# Patient Record
Sex: FEMALE | Race: WHITE | Employment: STUDENT | URBAN - METROPOLITAN AREA
[De-identification: names, ages, dates, MRNs, and addresses within clinical notes are randomized per-mention and may not be internally consistent; named-entity substitution may affect disease eponyms.]

---

## 2017-08-21 ENCOUNTER — OFFICE VISIT (OUTPATIENT)
Dept: OBGYN CLINIC | Facility: CLINIC | Age: 32
End: 2017-08-21

## 2017-08-21 DIAGNOSIS — IMO0002 MEET AND GREET FOR EXPECTANT MOTHER: Primary | ICD-10-CM

## 2017-08-22 ENCOUNTER — LAB ENCOUNTER (OUTPATIENT)
Dept: LAB | Facility: HOSPITAL | Age: 32
End: 2017-08-22
Attending: EMERGENCY MEDICINE
Payer: COMMERCIAL

## 2017-08-22 ENCOUNTER — NURSE ONLY (OUTPATIENT)
Dept: OBGYN CLINIC | Facility: CLINIC | Age: 32
End: 2017-08-22

## 2017-08-22 VITALS — HEIGHT: 65 IN

## 2017-08-22 DIAGNOSIS — Z34.03 ENCOUNTER FOR SUPERVISION OF NORMAL FIRST PREGNANCY IN THIRD TRIMESTER: ICD-10-CM

## 2017-08-22 DIAGNOSIS — Z34.03 ENCOUNTER FOR SUPERVISION OF NORMAL FIRST PREGNANCY IN THIRD TRIMESTER: Primary | ICD-10-CM

## 2017-08-22 DIAGNOSIS — Z3A.34 34 WEEKS GESTATION OF PREGNANCY: ICD-10-CM

## 2017-08-22 LAB
ANTIBODY SCREEN: NEGATIVE
BASOPHILS # BLD: 0 K/UL (ref 0–0.2)
BASOPHILS NFR BLD: 0 %
BILIRUB UR QL: NEGATIVE
COLOR UR: YELLOW
EOSINOPHIL # BLD: 0 K/UL (ref 0–0.7)
EOSINOPHIL NFR BLD: 1 %
ERYTHROCYTE [DISTWIDTH] IN BLOOD BY AUTOMATED COUNT: 12.8 % (ref 11–15)
GLUCOSE UR-MCNC: NEGATIVE MG/DL
HCT VFR BLD AUTO: 36.5 % (ref 35–48)
HGB BLD-MCNC: 12.5 G/DL (ref 12–16)
HGB UR QL STRIP.AUTO: NEGATIVE
KETONES UR-MCNC: NEGATIVE MG/DL
LEUKOCYTE ESTERASE UR QL STRIP.AUTO: NEGATIVE
LYMPHOCYTES # BLD: 1.3 K/UL (ref 1–4)
LYMPHOCYTES NFR BLD: 16 %
MCH RBC QN AUTO: 31.2 PG (ref 27–32)
MCHC RBC AUTO-ENTMCNC: 34.3 G/DL (ref 32–37)
MCV RBC AUTO: 90.8 FL (ref 80–100)
MONOCYTES # BLD: 0.7 K/UL (ref 0–1)
MONOCYTES NFR BLD: 9 %
NEUTROPHILS # BLD AUTO: 5.8 K/UL (ref 1.8–7.7)
NEUTROPHILS NFR BLD: 74 %
NITRITE UR QL STRIP.AUTO: NEGATIVE
PH UR: 7 [PH] (ref 5–8)
PLATELET # BLD AUTO: 313 K/UL (ref 140–400)
PMV BLD AUTO: 7.3 FL (ref 7.4–10.3)
PROT UR-MCNC: NEGATIVE MG/DL
RBC # BLD AUTO: 4.03 M/UL (ref 3.7–5.4)
RH BLOOD TYPE: POSITIVE
RUBV IGG SER-ACNC: 23.5 IU/ML
SP GR UR STRIP: 1.01 (ref 1–1.03)
UROBILINOGEN UR STRIP-ACNC: <2
VIT C UR-MCNC: NEGATIVE MG/DL
WBC # BLD AUTO: 7.8 K/UL (ref 4–11)

## 2017-08-22 PROCEDURE — 81003 URINALYSIS AUTO W/O SCOPE: CPT

## 2017-08-22 PROCEDURE — 86803 HEPATITIS C AB TEST: CPT

## 2017-08-22 PROCEDURE — 86900 BLOOD TYPING SEROLOGIC ABO: CPT

## 2017-08-22 PROCEDURE — 82652 VIT D 1 25-DIHYDROXY: CPT

## 2017-08-22 PROCEDURE — 86762 RUBELLA ANTIBODY: CPT

## 2017-08-22 PROCEDURE — 86787 VARICELLA-ZOSTER ANTIBODY: CPT

## 2017-08-22 PROCEDURE — 87086 URINE CULTURE/COLONY COUNT: CPT

## 2017-08-22 PROCEDURE — 86901 BLOOD TYPING SEROLOGIC RH(D): CPT

## 2017-08-22 PROCEDURE — 85025 COMPLETE CBC W/AUTO DIFF WBC: CPT

## 2017-08-22 PROCEDURE — 86850 RBC ANTIBODY SCREEN: CPT

## 2017-08-22 PROCEDURE — 36415 COLL VENOUS BLD VENIPUNCTURE: CPT

## 2017-08-22 PROCEDURE — 82306 VITAMIN D 25 HYDROXY: CPT

## 2017-08-22 NOTE — H&P
Pt here today for [facility]   Education visit, Educational material reviewed. Pt verbalized understanding. Rx given for missing pn labs.  Declined to have 1 hour glucose test done at this time, stated that if the Midwives see that she is experiencing symptoms of

## 2017-08-22 NOTE — PROGRESS NOTES
Reports low risk history. Currently living in [facility]. Has records from [facility] with normal prenatal care documented. Appropriate for RN ed visit and CNM visit in the next month. FHTs 140 today.

## 2017-08-23 ENCOUNTER — TELEPHONE (OUTPATIENT)
Dept: OBGYN CLINIC | Facility: CLINIC | Age: 32
End: 2017-08-23

## 2017-08-23 LAB — HCV AB SERPL QL IA: NONREACTIVE

## 2017-08-23 NOTE — TELEPHONE ENCOUNTER
----- Message from ESVIN Villarreal sent at 8/22/2017 10:09 PM CDT -----  pls review normal NOB labs

## 2017-08-23 NOTE — TELEPHONE ENCOUNTER
Informed pt that pt's UA, Rubella, Cbc normal NOB labs. Informed pt that her labs for Vit. D, Varicella, Hep C and Urine culture are still in process.

## 2017-08-24 LAB — VARICELLA-ZOSTER VIRUS AB, IGM: 0.01 ISR

## 2017-08-28 ENCOUNTER — APPOINTMENT (OUTPATIENT)
Dept: LAB | Facility: HOSPITAL | Age: 32
End: 2017-08-28
Attending: ADVANCED PRACTICE MIDWIFE
Payer: COMMERCIAL

## 2017-08-28 ENCOUNTER — INITIAL PRENATAL (OUTPATIENT)
Dept: OBGYN CLINIC | Facility: CLINIC | Age: 32
End: 2017-08-28

## 2017-08-28 VITALS
BODY MASS INDEX: 25 KG/M2 | DIASTOLIC BLOOD PRESSURE: 66 MMHG | SYSTOLIC BLOOD PRESSURE: 104 MMHG | WEIGHT: 149.63 LBS | HEART RATE: 62 BPM

## 2017-08-28 DIAGNOSIS — Z34.03 ENCOUNTER FOR SUPERVISION OF NORMAL FIRST PREGNANCY IN THIRD TRIMESTER: Primary | ICD-10-CM

## 2017-08-28 DIAGNOSIS — Z34.03 ENCOUNTER FOR SUPERVISION OF NORMAL FIRST PREGNANCY IN THIRD TRIMESTER: ICD-10-CM

## 2017-08-28 LAB
MULTISTIX LOT#: NORMAL NUMERIC
PH, URINE: 8 (ref 4.5–8)
SPECIFIC GRAVITY: 1.01 (ref 1–1.03)
URINE-COLOR: YELLOW
UROBILINOGEN,SEMI-QN: 0.2 MG/DL (ref 0–1.9)
VZV IGG SER IA-ACNC: 26.4 (ref 165–?)

## 2017-08-28 PROCEDURE — 36415 COLL VENOUS BLD VENIPUNCTURE: CPT

## 2017-08-28 PROCEDURE — 83036 HEMOGLOBIN GLYCOSYLATED A1C: CPT

## 2017-08-28 NOTE — PROGRESS NOTES
Reviewed labs declines DM screen. Hgba1c added to blood work if elevated will do GDM screen. Active baby. Patient reports hx of chicken pox however varicella titer is negative.

## 2017-08-29 ENCOUNTER — TELEPHONE (OUTPATIENT)
Dept: OBGYN CLINIC | Facility: CLINIC | Age: 32
End: 2017-08-29

## 2017-08-29 DIAGNOSIS — Z34.03 ENCOUNTER FOR SUPERVISION OF NORMAL FIRST PREGNANCY IN THIRD TRIMESTER: Primary | ICD-10-CM

## 2017-08-29 LAB
1,25-DIHYDROXYVITAMIN D: 144 PG/ML
HBA1C MFR BLD: 5.2 % (ref 4–6)

## 2017-08-29 NOTE — TELEPHONE ENCOUNTER
----- Message from ESVIN Villarreal sent at 8/28/2017 10:03 PM CDT -----  Pt is non-immune to varicella. Please advise her to take precautions during pregnancy to avoid contact with anyone who may be infected.   She could be vaccinated PP if desires

## 2017-08-30 ENCOUNTER — TELEPHONE (OUTPATIENT)
Dept: OBGYN CLINIC | Facility: CLINIC | Age: 32
End: 2017-08-30

## 2017-08-30 LAB — 25(OH)D3 SERPL-MCNC: 30.5 NG/ML

## 2017-08-30 NOTE — TELEPHONE ENCOUNTER
Pt needs a note stating she can take a prenatal water exercise class. Class is through [employer]. Would like it faxed to them [phone].  Pl adv

## 2017-08-30 NOTE — TELEPHONE ENCOUNTER
Called pt and informed her per BR. Pt reports was informed at visit with MBW. Pt then questioned result for Vitamin D. Pt was placed on hold and vitamin d was reviewed by MBW in office.  Informed per MBW that vitamin d was elevated and to stop supplementing

## 2017-08-30 NOTE — TELEPHONE ENCOUNTER
Letter comprised & faxed per pt request. Copy mailed to pt.  Pt notified, verbalized an understanding  & agrees w/ plan

## 2017-09-05 ENCOUNTER — TELEPHONE (OUTPATIENT)
Dept: OBGYN CLINIC | Facility: CLINIC | Age: 32
End: 2017-09-05

## 2017-09-11 ENCOUNTER — ROUTINE PRENATAL (OUTPATIENT)
Dept: OBGYN CLINIC | Facility: CLINIC | Age: 32
End: 2017-09-11

## 2017-09-11 VITALS
BODY MASS INDEX: 25.2 KG/M2 | DIASTOLIC BLOOD PRESSURE: 71 MMHG | WEIGHT: 151.25 LBS | SYSTOLIC BLOOD PRESSURE: 110 MMHG | HEIGHT: 65 IN | HEART RATE: 66 BPM

## 2017-09-11 DIAGNOSIS — Z34.03 ENCOUNTER FOR SUPERVISION OF NORMAL FIRST PREGNANCY IN THIRD TRIMESTER: Primary | ICD-10-CM

## 2017-09-11 LAB
APPEARANCE: CLEAR
MULTISTIX LOT#: NORMAL NUMERIC
PH, URINE: 7 (ref 4.5–8)
SPECIFIC GRAVITY: 1.02 (ref 1–1.03)
URINE-COLOR: YELLOW
UROBILINOGEN,SEMI-QN: 0 MG/DL (ref 0–1.9)

## 2017-09-11 NOTE — PROGRESS NOTES
Feeling well. No complaints. +FM. Rev SOL/when to call. GBS done and GC/CT. Living with her parents in [address] [place] [address], feeling settled and prepared.

## 2017-09-13 LAB
C TRACH DNA SPEC QL NAA+PROBE: NEGATIVE
N GONORRHOEA DNA SPEC QL NAA+PROBE: NEGATIVE

## 2017-09-20 ENCOUNTER — ROUTINE PRENATAL (OUTPATIENT)
Dept: OBGYN CLINIC | Facility: CLINIC | Age: 32
End: 2017-09-20

## 2017-09-20 VITALS
DIASTOLIC BLOOD PRESSURE: 69 MMHG | WEIGHT: 152 LBS | SYSTOLIC BLOOD PRESSURE: 105 MMHG | BODY MASS INDEX: 25 KG/M2 | HEART RATE: 68 BPM

## 2017-09-20 DIAGNOSIS — Z34.03 ENCOUNTER FOR SUPERVISION OF NORMAL FIRST PREGNANCY IN THIRD TRIMESTER: Primary | ICD-10-CM

## 2017-09-20 LAB
APPEARANCE: CLEAR
MULTISTIX LOT#: NORMAL NUMERIC
PH, URINE: 5 (ref 4.5–8)
SPECIFIC GRAVITY: 1 (ref 1–1.03)
URINE-COLOR: YELLOW
UROBILINOGEN,SEMI-QN: 0 MG/DL (ref 0–1.9)

## 2017-09-20 NOTE — PROGRESS NOTES
Active fetus Increasing BH contractions. Denies any leaking of fluid or bleeding. Reviewed S&S labor  Warning signs reviewed  All questions answered. 36 week information reviewed with pt.   Birth plan reviewed:    Support: FOB  Pain management: desires unme

## 2017-09-29 ENCOUNTER — ROUTINE PRENATAL (OUTPATIENT)
Dept: OBGYN CLINIC | Facility: CLINIC | Age: 32
End: 2017-09-29

## 2017-09-29 VITALS
WEIGHT: 154.38 LBS | BODY MASS INDEX: 26 KG/M2 | SYSTOLIC BLOOD PRESSURE: 108 MMHG | DIASTOLIC BLOOD PRESSURE: 68 MMHG | HEART RATE: 58 BPM

## 2017-09-29 DIAGNOSIS — Z34.93 NORMAL PREGNANCY IN THIRD TRIMESTER: Primary | ICD-10-CM

## 2017-09-29 LAB
APPEARANCE: CLEAR
MULTISTIX LOT#: NORMAL NUMERIC
PH, URINE: 6 (ref 4.5–8)
SPECIFIC GRAVITY: 1.02 (ref 1–1.03)
URINE-COLOR: YELLOW
UROBILINOGEN,SEMI-QN: 0 MG/DL (ref 0–1.9)

## 2017-09-29 PROCEDURE — 90471 IMMUNIZATION ADMIN: CPT | Performed by: ADVANCED PRACTICE MIDWIFE

## 2017-09-29 PROCEDURE — 90686 IIV4 VACC NO PRSV 0.5 ML IM: CPT | Performed by: ADVANCED PRACTICE MIDWIFE

## 2017-09-29 NOTE — PROGRESS NOTES
Baby active. No SOL. Cervix closed. Labor questions answered. Post dates plan reviewed. NST with next week visit. Kick counts, SOL & warning signs reviewed.

## 2017-10-09 ENCOUNTER — ROUTINE PRENATAL (OUTPATIENT)
Dept: OBGYN CLINIC | Facility: CLINIC | Age: 32
End: 2017-10-09

## 2017-10-09 ENCOUNTER — APPOINTMENT (OUTPATIENT)
Dept: OBGYN CLINIC | Facility: HOSPITAL | Age: 32
End: 2017-10-09
Payer: COMMERCIAL

## 2017-10-09 ENCOUNTER — HOSPITAL ENCOUNTER (OUTPATIENT)
Facility: HOSPITAL | Age: 32
Discharge: HOME | End: 2017-10-09
Attending: ADVANCED PRACTICE MIDWIFE | Admitting: OBSTETRICS & GYNECOLOGY
Payer: COMMERCIAL

## 2017-10-09 VITALS
HEART RATE: 56 BPM | SYSTOLIC BLOOD PRESSURE: 101 MMHG | BODY MASS INDEX: 26 KG/M2 | WEIGHT: 154 LBS | DIASTOLIC BLOOD PRESSURE: 66 MMHG

## 2017-10-09 DIAGNOSIS — Z34.03 ENCOUNTER FOR SUPERVISION OF NORMAL FIRST PREGNANCY IN THIRD TRIMESTER: Primary | ICD-10-CM

## 2017-10-09 PROCEDURE — 81002 URINALYSIS NONAUTO W/O SCOPE: CPT | Performed by: ADVANCED PRACTICE MIDWIFE

## 2017-10-09 NOTE — NST
Nonstress Test   Patient: Corinne Mercury    Gestation: 40w2d    NST: Postdates       Variability: Moderate           Accelerations: Yes           Decelerations: None            Baseline: 125 BPM           Uterine Irritability: No           Contractions: Not

## 2017-10-09 NOTE — PROGRESS NOTES
Active baby. Reactive NST. Plan for NST/CNM/CATALINO on 10/13. IOL at the latest 10/20. Reviewed danger signs.

## 2017-10-12 ENCOUNTER — TELEPHONE (OUTPATIENT)
Dept: OBGYN CLINIC | Facility: CLINIC | Age: 32
End: 2017-10-12

## 2017-10-12 NOTE — TELEPHONE ENCOUNTER
Pt reports losing mucus plug on Tuesday. Having mild irreg BH ctx of & on since then, especially at night. Unsure if its early labor. +fm. Denies lof or vb. 1st baby. GBS neg. Desires waterbirth. Reviewed labor precautions w/ pt. Pt has appt tomorrow.  Pt v

## 2017-10-13 ENCOUNTER — HOSPITAL ENCOUNTER (OUTPATIENT)
Facility: HOSPITAL | Age: 32
Discharge: HOME | End: 2017-10-13
Attending: ADVANCED PRACTICE MIDWIFE | Admitting: OBSTETRICS & GYNECOLOGY
Payer: COMMERCIAL

## 2017-10-13 ENCOUNTER — HOSPITAL ENCOUNTER (OUTPATIENT)
Dept: OBGYN CLINIC | Facility: HOSPITAL | Age: 32
Discharge: HOME | End: 2017-10-13
Payer: COMMERCIAL

## 2017-10-13 ENCOUNTER — ROUTINE PRENATAL (OUTPATIENT)
Dept: OBGYN CLINIC | Facility: CLINIC | Age: 32
End: 2017-10-13

## 2017-10-13 VITALS
WEIGHT: 154.25 LBS | DIASTOLIC BLOOD PRESSURE: 73 MMHG | SYSTOLIC BLOOD PRESSURE: 116 MMHG | HEART RATE: 64 BPM | HEIGHT: 65 IN | BODY MASS INDEX: 25.7 KG/M2

## 2017-10-13 VITALS — SYSTOLIC BLOOD PRESSURE: 112 MMHG | HEART RATE: 83 BPM | DIASTOLIC BLOOD PRESSURE: 73 MMHG

## 2017-10-13 DIAGNOSIS — Z34.03 ENCOUNTER FOR SUPERVISION OF NORMAL FIRST PREGNANCY IN THIRD TRIMESTER: Primary | ICD-10-CM

## 2017-10-13 PROCEDURE — 76815 OB US LIMITED FETUS(S): CPT | Performed by: ADVANCED PRACTICE MIDWIFE

## 2017-10-13 PROCEDURE — 59425 ANTEPARTUM CARE ONLY: CPT | Performed by: ADVANCED PRACTICE MIDWIFE

## 2017-10-13 PROCEDURE — 59025 FETAL NON-STRESS TEST: CPT | Performed by: ADVANCED PRACTICE MIDWIFE

## 2017-10-13 NOTE — PROGRESS NOTES
Baby active. Has been having contractions for a few days, getting a little stronger now. Reactive NST with contractions noted. CATALINO 5.4. Cervix 0/976/-8, Cephalic. Advised of low/borderline fluid.  Is likely in early labor now and will likely return tonight

## 2017-10-13 NOTE — NST
Nonstress Test   Patient: Orly Lopez    Gestation: 40w6d    NST:       Variability: Moderate           Accelerations: Yes           Decelerations: None            Baseline: 125 BPM           Uterine Irritability: Yes           Contractions: Irregular

## 2017-10-14 ENCOUNTER — HOSPITAL ENCOUNTER (INPATIENT)
Facility: HOSPITAL | Age: 32
LOS: 2 days | Discharge: HOME | End: 2017-10-16
Attending: ADVANCED PRACTICE MIDWIFE | Admitting: OBSTETRICS & GYNECOLOGY
Payer: COMMERCIAL

## 2017-10-14 PROBLEM — Z37.9 NORMAL LABOR: Status: ACTIVE | Noted: 2017-10-14

## 2017-10-14 PROCEDURE — 0KQM0ZZ REPAIR PERINEUM MUSCLE, OPEN APPROACH: ICD-10-PCS | Performed by: ADVANCED PRACTICE MIDWIFE

## 2017-10-14 PROCEDURE — 59410 OBSTETRICAL CARE: CPT | Performed by: ADVANCED PRACTICE MIDWIFE

## 2017-10-14 RX ORDER — SIMETHICONE 80 MG
80 TABLET,CHEWABLE ORAL 3 TIMES DAILY PRN
Status: DISCONTINUED | OUTPATIENT
Start: 2017-10-14 | End: 2017-10-16

## 2017-10-14 RX ORDER — IBUPROFEN 400 MG/1
200 TABLET ORAL EVERY 4 HOURS PRN
Status: DISCONTINUED | OUTPATIENT
Start: 2017-10-14 | End: 2017-10-16

## 2017-10-14 RX ORDER — SODIUM CHLORIDE 0.9 % (FLUSH) 0.9 %
10 SYRINGE (ML) INJECTION AS NEEDED
Status: DISCONTINUED | OUTPATIENT
Start: 2017-10-14 | End: 2017-10-16

## 2017-10-14 RX ORDER — TERBUTALINE SULFATE 1 MG/ML
0.25 INJECTION, SOLUTION SUBCUTANEOUS AS NEEDED
Status: DISCONTINUED | OUTPATIENT
Start: 2017-10-14 | End: 2017-10-14 | Stop reason: HOSPADM

## 2017-10-14 RX ORDER — IBUPROFEN 600 MG/1
600 TABLET ORAL ONCE AS NEEDED
Status: DISCONTINUED | OUTPATIENT
Start: 2017-10-14 | End: 2017-10-14 | Stop reason: HOSPADM

## 2017-10-14 RX ORDER — LIDOCAINE HYDROCHLORIDE 10 MG/ML
30 INJECTION, SOLUTION EPIDURAL; INFILTRATION; INTRACAUDAL; PERINEURAL ONCE
Status: DISCONTINUED | OUTPATIENT
Start: 2017-10-14 | End: 2017-10-14 | Stop reason: HOSPADM

## 2017-10-14 RX ORDER — BISACODYL 10 MG
10 SUPPOSITORY, RECTAL RECTAL ONCE AS NEEDED
Status: DISCONTINUED | OUTPATIENT
Start: 2017-10-14 | End: 2017-10-16

## 2017-10-14 RX ORDER — AMMONIA INHALANTS 0.04 G/.3ML
0.3 INHALANT RESPIRATORY (INHALATION) AS NEEDED
Status: DISCONTINUED | OUTPATIENT
Start: 2017-10-14 | End: 2017-10-16

## 2017-10-14 RX ORDER — DOCUSATE SODIUM 100 MG/1
100 CAPSULE, LIQUID FILLED ORAL 2 TIMES DAILY
Status: DISCONTINUED | OUTPATIENT
Start: 2017-10-14 | End: 2017-10-16

## 2017-10-14 RX ORDER — SODIUM CHLORIDE 0.9 % (FLUSH) 0.9 %
10 SYRINGE (ML) INJECTION AS NEEDED
Status: DISCONTINUED | OUTPATIENT
Start: 2017-10-14 | End: 2017-10-14 | Stop reason: HOSPADM

## 2017-10-14 RX ORDER — AMMONIA INHALANTS 0.04 G/.3ML
0.3 INHALANT RESPIRATORY (INHALATION) AS NEEDED
Status: DISCONTINUED | OUTPATIENT
Start: 2017-10-14 | End: 2017-10-14 | Stop reason: HOSPADM

## 2017-10-14 RX ORDER — SODIUM CHLORIDE, SODIUM LACTATE, POTASSIUM CHLORIDE, CALCIUM CHLORIDE 600; 310; 30; 20 MG/100ML; MG/100ML; MG/100ML; MG/100ML
INJECTION, SOLUTION INTRAVENOUS
Status: DISPENSED
Start: 2017-10-14 | End: 2017-10-14

## 2017-10-14 RX ORDER — LIDOCAINE HYDROCHLORIDE 10 MG/ML
INJECTION, SOLUTION EPIDURAL; INFILTRATION; INTRACAUDAL; PERINEURAL
Status: DISPENSED
Start: 2017-10-14 | End: 2017-10-14

## 2017-10-14 RX ORDER — IBUPROFEN 400 MG/1
400 TABLET ORAL EVERY 4 HOURS PRN
Status: DISCONTINUED | OUTPATIENT
Start: 2017-10-14 | End: 2017-10-16

## 2017-10-14 RX ORDER — PRENATAL VIT,CAL 76/IRON/FOLIC 29 MG-1 MG
1 TABLET ORAL DAILY
Status: DISCONTINUED | OUTPATIENT
Start: 2017-10-14 | End: 2017-10-16

## 2017-10-14 RX ORDER — DEXTROSE, SODIUM CHLORIDE, SODIUM LACTATE, POTASSIUM CHLORIDE, AND CALCIUM CHLORIDE 5; .6; .31; .03; .02 G/100ML; G/100ML; G/100ML; G/100ML; G/100ML
125 INJECTION, SOLUTION INTRAVENOUS CONTINUOUS
Status: DISCONTINUED | OUTPATIENT
Start: 2017-10-14 | End: 2017-10-14 | Stop reason: HOSPADM

## 2017-10-14 RX ORDER — IBUPROFEN 600 MG/1
600 TABLET ORAL EVERY 4 HOURS PRN
Status: DISCONTINUED | OUTPATIENT
Start: 2017-10-14 | End: 2017-10-16

## 2017-10-14 RX ORDER — TRISODIUM CITRATE DIHYDRATE AND CITRIC ACID MONOHYDRATE 500; 334 MG/5ML; MG/5ML
30 SOLUTION ORAL AS NEEDED
Status: DISCONTINUED | OUTPATIENT
Start: 2017-10-14 | End: 2017-10-14 | Stop reason: HOSPADM

## 2017-10-14 NOTE — H&P
[address] Patient Status:  Inpatient    1985 MRN [id number]   Location [address] [place] [address] Attending [address] Day # 0 Joanna Wen MD     Date of age and cooperative uncomfortable  Respiratory: clear, unlabored  Cardiac: RRR  Breast: normal appearance, nipples everted  Abdomen: soft, non-tender, EFW 6 1/2#, uterine contractions q 1-3  Sterile vaginal exam: 6-7/100/0  External Genitalia:  No lesions Time    HCT 38.3 % 35.0 - 48.0 % 10/14/17 0108    HGB 13.0 g/dL 12.0 - 16.0 g/dL 10/14/17 0108    Platelets 592 K/ - 400 K/UL 10/14/17 0108    TREP        Genital Group B Culture No Beta Hemolytic Strep Group B Isolated.    09/11/17 1503    TSH

## 2017-10-14 NOTE — PLAN OF CARE
Patient Centered Care    • Patient preferences are identified and integrated in the patient's plan of care Progressing        POSTPARTUM    • [address] normal postpartum course Progressing    • Optimize infant feeding at the breast Progres

## 2017-10-14 NOTE — PROGRESS NOTES
Patient received into room 360. Bedside report received from   [facility] . Patient transferred to bed from cart. Bed in locked and low position. Side rails x2 up. Vital signs within normal limits, fundus firm U/U  , lochia  small, no clots noted.  IV site in

## 2017-10-14 NOTE — L&D DELIVERY NOTE
Gerda Burton, Girl  [[id number]]     Labor Events    Rupture date:  10/14/17   Rupture time:  0058   Rupture type:  SROM   Fluid color:  Clear          Labor Event Times    Labor onset date/time:   10/13/17 2300   Dilation complete date/time:   10/14/17 0144 CDT Reflex irritability:   Muscle tone:   Respiratory effort:    Total:            0    2    2    2    2    8             1    2    2    2    2    9                                                Apgars assigned by:  Mary Trinidad RN   Vernon disposition:  wit

## 2017-10-15 NOTE — PROGRESS NOTES
[facility]    OB/GYNE Progress Note      Waleska Clemons Patient Status:  Inpatient    1985 MRN    Location [facility] 3SE Attending [address] [place] [address], [address] [place] [address] Rd Day #  PCP Lora Fam MD           HPI:   Waleska Clemons is a reports nipples are intact without redness or irritation, breastfeeding successfully  GI: denies abdominal pain,denies heartburn, denies constipation or diarrhea, no BM  : denies dysuria, pelvic pain, heavy lochia or clots  PERINEUM: sore but no acute pa

## 2017-10-15 NOTE — PLAN OF CARE
ANXIETY    • Will report anxiety at manageable levels Progressing        BREAST FEEDING    • Optimize infant feeding at the breast Progressing        INADEQUATE LATCH, SUCK OR SWALLOW    • Demonstrate ability to latch and sustain latch, audible swallowing Problem: Goal Outcome Summary  Goal: Goal Outcome Summary  Outcome: Adequate for Discharge Date Met:  03/17/17  Pain managed with Oxycodone and Tylenol, voiding in BR, up with assist 1 and walker, discharged home with .

## 2017-10-16 VITALS
RESPIRATION RATE: 17 BRPM | HEART RATE: 69 BPM | TEMPERATURE: 98 F | SYSTOLIC BLOOD PRESSURE: 107 MMHG | DIASTOLIC BLOOD PRESSURE: 69 MMHG

## 2017-10-16 NOTE — PROGRESS NOTES
Home in stable condition. No pt c/o. V/s and vaginal bleeding WNR. Teaching and dc instructions reinforced, pt watched dc videos, has education booklet, Q&A done.  F/u appts given  Home w/baby id bands verified via wheelchair accompanied by

## 2017-10-16 NOTE — LACTATION NOTE
LACTATION NOTE - MOTHER      Evaluation Type: Inpatient    Problems identified  Problems identified: Nipple pain;Knowledge deficit    Maternal history  Other/comment: IBS    Breastfeeding goal  Breastfeeding goal: To maintain breast milk feeding per patien

## 2017-10-16 NOTE — DISCHARGE SUMMARY
[facility]    Discharge Summary    Devaughn Ventura Patient Status:  Inpatient    1985 MRN [id number]   Location [facility] 3SE Attending ESVIN Hernandez   Hosp Day # 2       Delivering OB Clinician: Chema Anderson

## 2017-10-26 ENCOUNTER — TELEPHONE (OUTPATIENT)
Dept: LACTATION | Facility: HOSPITAL | Age: 32
End: 2017-10-26

## 2017-10-31 ENCOUNTER — POSTPARTUM (OUTPATIENT)
Dept: OBGYN CLINIC | Facility: CLINIC | Age: 32
End: 2017-10-31

## 2017-10-31 VITALS
HEART RATE: 46 BPM | BODY MASS INDEX: 23 KG/M2 | DIASTOLIC BLOOD PRESSURE: 76 MMHG | WEIGHT: 140 LBS | SYSTOLIC BLOOD PRESSURE: 112 MMHG

## 2017-10-31 DIAGNOSIS — Z30.09 FAMILY PLANNING: Primary | ICD-10-CM

## 2017-10-31 PROBLEM — Z34.03: Status: RESOLVED | Noted: 2017-08-28 | Resolved: 2017-10-31

## 2017-11-01 NOTE — PROGRESS NOTES
HPI:   Polina Odom is a 29year old  who presents for a 2 week Postpartum visit. Pt accompanied by baby  Reports adapting well and coping well. Breastfeeding successfully. No strain in relationship, partner supportive.     Labor and Delivery Histor established, denies nipple pain, cracking or bleeding, denies mastitis symptoms.   Baby nursing well and gaining weight appropriately  GI: denies abdominal pain,denies heartburn, denies constipation or diarrhea  : voiding without difficulty, denies dysuri

## 2017-11-03 ENCOUNTER — TELEPHONE (OUTPATIENT)
Dept: OBGYN CLINIC | Facility: CLINIC | Age: 32
End: 2017-11-03

## 2017-11-03 NOTE — TELEPHONE ENCOUNTER
Noted that pt was scheduled for nexplanon w/ MJ on 12/1 at her 6wk pp visit & MJ does not do nexplanon insert. Called pt to reschedule w/ BR or MBW. Appt rescheduled w/ BR on 11/30.  Pt verbalized an understanding & agrees w/ plan

## 2017-11-30 ENCOUNTER — OFFICE VISIT (OUTPATIENT)
Dept: OBGYN CLINIC | Facility: CLINIC | Age: 32
End: 2017-11-30

## 2017-11-30 VITALS
HEART RATE: 80 BPM | BODY MASS INDEX: 22.82 KG/M2 | WEIGHT: 137 LBS | SYSTOLIC BLOOD PRESSURE: 102 MMHG | DIASTOLIC BLOOD PRESSURE: 70 MMHG | HEIGHT: 65 IN

## 2017-11-30 DIAGNOSIS — R39.89 PAIN IN URETHRA: Primary | ICD-10-CM

## 2017-11-30 DIAGNOSIS — Z30.017 INSERTION OF IMPLANTABLE SUBDERMAL CONTRACEPTIVE: ICD-10-CM

## 2017-11-30 PROBLEM — Z37.9 NORMAL LABOR: Status: RESOLVED | Noted: 2017-10-14 | Resolved: 2017-11-30

## 2017-11-30 PROCEDURE — 81025 URINE PREGNANCY TEST: CPT | Performed by: ADVANCED PRACTICE MIDWIFE

## 2017-11-30 PROCEDURE — 11981 INSERTION DRUG DLVR IMPLANT: CPT | Performed by: ADVANCED PRACTICE MIDWIFE

## 2017-11-30 NOTE — PROGRESS NOTES
HPI:   Cedrick Franco is a 29year old  who presents for a 6 week Postpartum visit  Reports adapting well and coping well. Breastfeeding successfully. No strain in relationship, partner supportive. Denies DV.       Labor and Delivery History:   del well established, denies nipple pain, cracking or bleeding, denies mastitis symptoms.   Baby nursing well and gaining weight appropriately  GI: denies abdominal pain,denies heartburn, denies constipation or diarrhea  : Reports discomfort at urethra at end urologist if symptoms not resolved.   Rev contraceptive options and patient elects Nexplanon insertion today  Follow-up for annual/PAP and Nexplanon check in 1/2018 or call prn    Assessment and plan by KATE Beckman under direct supervision of Laurie

## 2017-11-30 NOTE — PROCEDURES
Nexplanon Insertion/Removal    Pregnancy Results: negative from urine test   Birth control method(s) used: abstinence ; date last used:    Consent was obtained from the patient. Insertion:    The patient was positioned with her left arm flexed.     [place]

## 2018-02-12 ENCOUNTER — TELEPHONE (OUTPATIENT)
Dept: OBGYN CLINIC | Facility: CLINIC | Age: 33
End: 2018-02-12

## 2018-02-12 NOTE — TELEPHONE ENCOUNTER
Pt has her annual on 2/20/18 and wondering if she can get her nexplanon removed at that appt as well.  Please advise

## 2018-02-12 NOTE — TELEPHONE ENCOUNTER
Spoke with pt and advised she can have nexplanon removed during annual appt scheduled on 2/20. Pt agreed and voiced understanding.

## 2018-02-20 ENCOUNTER — OFFICE VISIT (OUTPATIENT)
Dept: OBGYN CLINIC | Facility: CLINIC | Age: 33
End: 2018-02-20

## 2018-02-20 VITALS
HEIGHT: 65 IN | BODY MASS INDEX: 21.56 KG/M2 | HEART RATE: 86 BPM | DIASTOLIC BLOOD PRESSURE: 69 MMHG | SYSTOLIC BLOOD PRESSURE: 102 MMHG | WEIGHT: 129.38 LBS

## 2018-02-20 DIAGNOSIS — Z12.4 SCREENING FOR MALIGNANT NEOPLASM OF CERVIX: Primary | ICD-10-CM

## 2018-02-20 PROCEDURE — 99395 PREV VISIT EST AGE 18-39: CPT | Performed by: ADVANCED PRACTICE MIDWIFE

## 2018-02-21 ENCOUNTER — TELEPHONE (OUTPATIENT)
Dept: OBGYN CLINIC | Facility: CLINIC | Age: 33
End: 2018-02-21

## 2018-02-21 PROBLEM — N92.6 IRREGULAR BLEEDING: Status: ACTIVE | Noted: 2018-02-21

## 2018-02-21 PROBLEM — N63.0 LUMP OR MASS IN BREAST: Status: ACTIVE | Noted: 2018-02-21

## 2018-02-21 LAB — HPV I/H RISK 1 DNA SPEC QL NAA+PROBE: NEGATIVE

## 2018-02-21 RX ORDER — NORETHINDRONE ACETATE AND ETHINYL ESTRADIOL 1; .02 MG/1; MG/1
1 TABLET ORAL DAILY
Qty: 1 PACKAGE | Refills: 3 | Status: SHIPPED | OUTPATIENT
Start: 2018-02-21 | End: 2018-03-21

## 2018-02-21 NOTE — PROGRESS NOTES
HPI:   Isa Chavez is a 29year old female who presents for annual exam with PAP. Desires Nexplanon removal.  Pt has had device for 3 months and has been experiencing frequent menstrual bleeding.   Denies very heavy bleeding or significant cramping, but i shortness of breath with exertion  CARDIOVASCULAR: denies chest pain on exertion  GI: denies abdominal pain,denies heartburn, denies constipation or diarrhea  : denies dysuria, pelvic pain, vaginal discharge or discomfort; periods regular, denies abnorma with time   -Pt will consider wiating one more month   -Will probably want LAKE or Nuva Ring if brinda implant removed    Counseled patient on PAP screening guidelines; next PAP in 3-5 years if NIML/HPV Neg today  Discussed breast awareness  Discussed Multivi

## 2018-02-21 NOTE — TELEPHONE ENCOUNTER
Reviewed with patient her options of trying NSAIDs or COCs to stabilize endometrium before having implant removed. She agrees. Will try Ibuprofen 600-800mg tid for 5-10 days. If no improvement may try a course of low dose COCs for 3 months.   RX to [facility]

## 2018-04-17 ENCOUNTER — TELEPHONE (OUTPATIENT)
Dept: OBGYN CLINIC | Facility: CLINIC | Age: 33
End: 2018-04-17

## 2018-04-17 DIAGNOSIS — O92.70 DISORDER OF LACTATION: Primary | ICD-10-CM

## 2018-04-17 DIAGNOSIS — N63.0 LUMP OR MASS IN BREAST: ICD-10-CM

## 2018-04-18 ENCOUNTER — TELEPHONE (OUTPATIENT)
Dept: OBGYN CLINIC | Facility: CLINIC | Age: 33
End: 2018-04-18

## 2018-04-18 NOTE — TELEPHONE ENCOUNTER
Pt is requesting an appt to remove her Nexplanon and to discuss the Breast US results. Appt scheduled with BR on 4/24/18. Pt agrees with plan.

## 2018-04-18 NOTE — TELEPHONE ENCOUNTER
Pt requesting nexplanon removal. Also if test results for US can be discussed at that appt.  pls adv

## 2018-04-18 NOTE — TELEPHONE ENCOUNTER
Rafal Deluca has entered the order for the L Breast US and pt is to call Central Scheduling to schedule appt. Pt is to f/u with BR in the office after she has completed the [address].

## 2018-04-18 NOTE — TELEPHONE ENCOUNTER
Pt was advised that BR has entered the order for the L Breast US and pt is to call and schedule her appt. Contact information given to pt. Pt is to have a f/u appt with BR after she has completed the L Breast US. PDt will call for appt.   Pt agrees with p

## 2018-04-20 ENCOUNTER — HOSPITAL ENCOUNTER (OUTPATIENT)
Dept: MAMMOGRAPHY | Facility: HOSPITAL | Age: 33
Discharge: HOME | End: 2018-04-20
Attending: ADVANCED PRACTICE MIDWIFE
Payer: COMMERCIAL

## 2018-04-20 DIAGNOSIS — N63.0 LUMP OR MASS IN BREAST: ICD-10-CM

## 2018-04-20 DIAGNOSIS — O92.70 DISORDER OF LACTATION: ICD-10-CM

## 2018-04-20 PROCEDURE — 76642 ULTRASOUND BREAST LIMITED: CPT | Performed by: ADVANCED PRACTICE MIDWIFE

## 2018-04-24 ENCOUNTER — OFFICE VISIT (OUTPATIENT)
Dept: OBGYN CLINIC | Facility: CLINIC | Age: 33
End: 2018-04-24

## 2018-04-24 VITALS
HEIGHT: 65 IN | BODY MASS INDEX: 20.01 KG/M2 | WEIGHT: 120.13 LBS | HEART RATE: 64 BPM | DIASTOLIC BLOOD PRESSURE: 74 MMHG | SYSTOLIC BLOOD PRESSURE: 111 MMHG

## 2018-04-24 DIAGNOSIS — Z30.017 NEXPLANON INSERTION: Primary | ICD-10-CM

## 2018-04-24 DIAGNOSIS — Z30.46 ENCOUNTER FOR REMOVAL OF SUBDERMAL CONTRACEPTIVE IMPLANT: ICD-10-CM

## 2018-04-24 PROCEDURE — 11983 REMOVE/INSERT DRUG IMPLANT: CPT | Performed by: ADVANCED PRACTICE MIDWIFE

## 2018-04-24 PROCEDURE — 99212 OFFICE O/P EST SF 10 MIN: CPT | Performed by: ADVANCED PRACTICE MIDWIFE

## 2018-04-24 RX ORDER — ETONOGESTREL AND ETHINYL ESTRADIOL 11.7; 2.7 MG/1; MG/1
1 INSERT, EXTENDED RELEASE VAGINAL
Qty: 3 EACH | Refills: 4 | Status: SHIPPED | OUTPATIENT
Start: 2018-04-24

## 2018-04-25 PROBLEM — N92.6 IRREGULAR BLEEDING: Status: RESOLVED | Noted: 2018-02-21 | Resolved: 2018-04-25

## 2018-04-25 NOTE — PROGRESS NOTES
[facility] Focused Gynecology Problem Exam    Kaylynn Caballero is a 29year old female presenting for Procedure (Nexplanon Removal )      HPI:   Patient presents with:  Procedure: Nexplanon Removal   Pt has been using OCP in additional to Nexpl Never Used    Alcohol use No    Drug use: No    Sexual activity: Not on file     Other Topics Concern   None on file     Social History Narrative   None on file     PE:  Appearance: NAD  Breast: LEFT breast with <1cm, barely palpable mass corresponding to

## 2018-04-25 NOTE — PROCEDURES
Nexplanon Removal    Consent was obtained from the patient. Removal:  1 % lidocaine with Epinephrine was injected underneath the tip of the Nexplanon garry that is closest to the Left elbow. Nexplanon was located by palpation.   5 mm incision was made at

## 2021-12-09 LAB
C TRACH RRNA SPEC QL NAA+PROBE: NEGATIVE
N GONORRHOEA RRNA SPEC QL NAA+PROBE: NEGATIVE

## 2022-01-01 ENCOUNTER — EXTERNAL RECORD (OUTPATIENT)
Dept: OTHER | Age: 37
End: 2022-01-01

## 2022-01-10 ENCOUNTER — FIRST OB VISIT (OUTPATIENT)
Dept: OBGYN | Age: 37
End: 2022-01-10

## 2022-01-10 ENCOUNTER — LAB SERVICES (OUTPATIENT)
Dept: LAB | Age: 37
End: 2022-01-10

## 2022-01-10 VITALS
HEART RATE: 65 BPM | TEMPERATURE: 98.5 F | WEIGHT: 126.76 LBS | RESPIRATION RATE: 14 BRPM | OXYGEN SATURATION: 100 % | DIASTOLIC BLOOD PRESSURE: 64 MMHG | SYSTOLIC BLOOD PRESSURE: 103 MMHG | HEIGHT: 65 IN | BODY MASS INDEX: 21.12 KG/M2

## 2022-01-10 DIAGNOSIS — O20.9 BLEEDING IN EARLY PREGNANCY: ICD-10-CM

## 2022-01-10 DIAGNOSIS — Z34.90 PREGNANCY, UNSPECIFIED GESTATIONAL AGE: Primary | ICD-10-CM

## 2022-01-10 DIAGNOSIS — Z34.90 PREGNANCY, UNSPECIFIED GESTATIONAL AGE: ICD-10-CM

## 2022-01-10 LAB
APPEARANCE, POC: CLEAR
B-HCG UR QL: POSITIVE
BILIRUBIN, POC: NEGATIVE
COLOR, POC: YELLOW
GLUCOSE UR-MCNC: NEGATIVE MG/DL
INTERNAL PROCEDURAL CONTROLS ACCEPTABLE: YES
KETONES, POC: NEGATIVE MG/DL
NITRITE, POC: NEGATIVE
OCCULT BLOOD, POC: ABNORMAL
PH UR: 7 [PH] (ref 5–7)
PROT UR-MCNC: NEGATIVE MG/DL
SP GR UR: 1.02 (ref 1–1.03)
UROBILINOGEN UR-MCNC: 0.2 MG/DL (ref 0–1)
WBC (LEUKOCYTE) ESTERASE, POC: NEGATIVE

## 2022-01-10 PROCEDURE — 36415 COLL VENOUS BLD VENIPUNCTURE: CPT | Performed by: PSYCHIATRY & NEUROLOGY

## 2022-01-10 PROCEDURE — 99203 OFFICE O/P NEW LOW 30 MIN: CPT | Performed by: ADVANCED PRACTICE MIDWIFE

## 2022-01-10 PROCEDURE — 86900 BLOOD TYPING SEROLOGIC ABO: CPT | Performed by: PSYCHIATRY & NEUROLOGY

## 2022-01-10 PROCEDURE — 87389 HIV-1 AG W/HIV-1&-2 AB AG IA: CPT | Performed by: PSYCHIATRY & NEUROLOGY

## 2022-01-10 PROCEDURE — 86803 HEPATITIS C AB TEST: CPT | Performed by: PSYCHIATRY & NEUROLOGY

## 2022-01-10 PROCEDURE — 81025 URINE PREGNANCY TEST: CPT | Performed by: ADVANCED PRACTICE MIDWIFE

## 2022-01-10 PROCEDURE — 85027 COMPLETE CBC AUTOMATED: CPT | Performed by: PSYCHIATRY & NEUROLOGY

## 2022-01-10 PROCEDURE — 87340 HEPATITIS B SURFACE AG IA: CPT | Performed by: PSYCHIATRY & NEUROLOGY

## 2022-01-10 PROCEDURE — 86850 RBC ANTIBODY SCREEN: CPT | Performed by: PSYCHIATRY & NEUROLOGY

## 2022-01-10 PROCEDURE — 86780 TREPONEMA PALLIDUM: CPT | Performed by: PSYCHIATRY & NEUROLOGY

## 2022-01-10 PROCEDURE — 86787 VARICELLA-ZOSTER ANTIBODY: CPT | Performed by: PSYCHIATRY & NEUROLOGY

## 2022-01-10 PROCEDURE — 86762 RUBELLA ANTIBODY: CPT | Performed by: PSYCHIATRY & NEUROLOGY

## 2022-01-10 PROCEDURE — 81002 URINALYSIS NONAUTO W/O SCOPE: CPT | Performed by: ADVANCED PRACTICE MIDWIFE

## 2022-01-10 PROCEDURE — 84702 CHORIONIC GONADOTROPIN TEST: CPT | Performed by: PSYCHIATRY & NEUROLOGY

## 2022-01-10 PROCEDURE — 86901 BLOOD TYPING SEROLOGIC RH(D): CPT | Performed by: PSYCHIATRY & NEUROLOGY

## 2022-01-10 ASSESSMENT — EDINBURGH POSTNATAL DEPRESSION SCALE (EPDS)
THINGS HAVE BEEN GETTING ON TOP OF ME: NO, MOST OF THE TIME I HAVE COPED QUITE WELL
THE THOUGHT OF HARMING MYSELF HAS OCCURRED TO ME: NEVER
I HAVE FELT SCARED OR PANICKY FOR NO GOOD REASON: NO, NOT MUCH
I HAVE FELT SAD OR MISERABLE: NOT VERY OFTEN
I HAVE BEEN ABLE TO LAUGH AND SEE THE FUNNY SIDE OF THINGS: AS MUCH AS I ALWAYS COULD
I HAVE BLAMED MYSELF UNNECESSARILY WHEN THINGS WENT WRONG: NOT VERY OFTEN
I HAVE BEEN SO UNHAPPY THAT I HAVE HAD DIFFICULTY SLEEPING: NOT AT ALL
TOTAL SCORE: 5
I HAVE BEEN ANXIOUS OR WORRIED FOR NO GOOD REASON: HARDLY EVER
I HAVE BEEN SO UNHAPPY THAT I HAVE BEEN CRYING: NO, NEVER
I HAVE LOOKED FORWARD WITH ENJOYMENT TO THINGS: AS MUCH AS I EVER DID

## 2022-01-10 ASSESSMENT — ENCOUNTER SYMPTOMS
DIZZINESS: 0
SEIZURES: 0
LIGHT-HEADEDNESS: 0
APPETITE CHANGE: 1
CONSTIPATION: 0
HEADACHES: 0
CHEST TIGHTNESS: 0
VOMITING: 0
NAUSEA: 1
SHORTNESS OF BREATH: 0
ABDOMINAL PAIN: 0
NERVOUS/ANXIOUS: 0
APNEA: 0
DIARRHEA: 0

## 2022-01-10 ASSESSMENT — LIFESTYLE VARIABLES
DO ANY OF YOUR FRIENDS (PEERS) HAVE PROBLEMS WITH ALCOHOL OR DRUG USE?: NO
DID ANY OF YOUR PARENTS HAVE PROBLEMS WITH ALCOHOL OR DRUG USE?: NO
IN THE PAST MONTH, DID YOU DRINK BEER, WINE OR LIQUOR, OR USE OTHER DRUGS? (PREGNANCY): NO
DOES YOUR PARTNER HAVE A PROBLEM WITH ALCOHOL OR DRUG USE?: NO
BEFORE YOU WERE PREGNANT DID YOU HAVE A PROBLEM WITH ALCOHOL OR DRUG USE? (PAST): NO

## 2022-01-10 ASSESSMENT — PAIN SCALES - GENERAL: PAINLEVEL: 0

## 2022-01-11 LAB
ABO + RH BLD: NORMAL
BLD GP AB SCN SERPL QL GEL: NEGATIVE
DEPRECATED RDW RBC: 40 FL (ref 39–50)
ERYTHROCYTE [DISTWIDTH] IN BLOOD: 11.9 % (ref 11–15)
HBV SURFACE AG SER QL: NEGATIVE
HCG SERPL-ACNC: ABNORMAL MUNITS/ML
HCT VFR BLD CALC: 34.3 % (ref 36–46.5)
HCV AB SER QL: NEGATIVE
HGB BLD-MCNC: 11.9 G/DL (ref 12–15.5)
HIV 1+2 AB+HIV1 P24 AG SERPL QL IA: NONREACTIVE
MCH RBC QN AUTO: 31.6 PG (ref 26–34)
MCHC RBC AUTO-ENTMCNC: 34.7 G/DL (ref 32–36.5)
MCV RBC AUTO: 91.2 FL (ref 78–100)
NRBC BLD MANUAL-RTO: 0 /100 WBC
PLATELET # BLD AUTO: 298 K/MCL (ref 140–450)
RBC # BLD: 3.76 MIL/MCL (ref 4–5.2)
RUBV IGG SERPL IA-ACNC: 39.5 UNITS/ML
T PALLIDUM IGG SER QL: NONREACTIVE
VZV IGG SER IA-ACNC: NORMAL
WBC # BLD: 9 K/MCL (ref 4.2–11)

## 2022-01-28 ENCOUNTER — OFFICE VISIT (OUTPATIENT)
Dept: MATERNAL FETAL MEDICINE | Age: 37
End: 2022-01-28
Attending: ADVANCED PRACTICE MIDWIFE

## 2022-01-28 DIAGNOSIS — O36.80X0 ENCOUNTER TO DETERMINE FETAL VIABILITY OF PREGNANCY, SINGLE OR UNSPECIFIED FETUS: Primary | ICD-10-CM

## 2022-01-28 DIAGNOSIS — Z3A.11 11 WEEKS GESTATION OF PREGNANCY: ICD-10-CM

## 2022-01-28 DIAGNOSIS — O09.521 MULTIGRAVIDA OF ADVANCED MATERNAL AGE IN FIRST TRIMESTER: ICD-10-CM

## 2022-01-28 PROCEDURE — 76801 OB US < 14 WKS SINGLE FETUS: CPT | Performed by: OBSTETRICS & GYNECOLOGY

## 2022-02-07 ENCOUNTER — TELEPHONE (OUTPATIENT)
Dept: OBGYN | Age: 37
End: 2022-02-07

## 2022-02-07 ENCOUNTER — OB CHECK (OUTPATIENT)
Dept: OBGYN | Age: 37
End: 2022-02-07

## 2022-02-07 VITALS
DIASTOLIC BLOOD PRESSURE: 58 MMHG | SYSTOLIC BLOOD PRESSURE: 100 MMHG | HEART RATE: 75 BPM | BODY MASS INDEX: 22.77 KG/M2 | TEMPERATURE: 98.4 F | HEIGHT: 65 IN | WEIGHT: 136.69 LBS | RESPIRATION RATE: 12 BRPM | OXYGEN SATURATION: 100 %

## 2022-02-07 DIAGNOSIS — Z3A.13 13 WEEKS GESTATION OF PREGNANCY: Primary | ICD-10-CM

## 2022-02-07 LAB
APPEARANCE, POC: CLEAR
BILIRUBIN, POC: NEGATIVE
COLOR, POC: YELLOW
GLUCOSE UR-MCNC: NEGATIVE MG/DL
KETONES, POC: NEGATIVE MG/DL
NITRITE, POC: NEGATIVE
OCCULT BLOOD, POC: NEGATIVE
PH UR: 6.5 [PH] (ref 5–7)
PROT UR-MCNC: NEGATIVE MG/DL
SP GR UR: 1.02 (ref 1–1.03)
UROBILINOGEN UR-MCNC: 0.2 MG/DL (ref 0–1)
WBC (LEUKOCYTE) ESTERASE, POC: NEGATIVE

## 2022-02-07 PROCEDURE — 81002 URINALYSIS NONAUTO W/O SCOPE: CPT

## 2022-02-07 PROCEDURE — 87086 URINE CULTURE/COLONY COUNT: CPT | Performed by: PSYCHIATRY & NEUROLOGY

## 2022-02-07 PROCEDURE — 0502F SUBSEQUENT PRENATAL CARE: CPT

## 2022-02-07 RX ORDER — MULTIVITAMIN WITH IRON
100 TABLET ORAL DAILY
COMMUNITY
End: 2022-05-23 | Stop reason: CLARIF

## 2022-02-07 ASSESSMENT — PAIN SCALES - GENERAL: PAINLEVEL: 0

## 2022-02-09 LAB — BACTERIA UR CULT: NORMAL

## 2022-02-15 ENCOUNTER — TELEPHONE (OUTPATIENT)
Dept: OBGYN | Age: 37
End: 2022-02-15

## 2022-02-16 ENCOUNTER — EXTERNAL RECORD (OUTPATIENT)
Dept: HEALTH INFORMATION MANAGEMENT | Facility: OTHER | Age: 37
End: 2022-02-16

## 2022-02-21 ENCOUNTER — TELEPHONE (OUTPATIENT)
Dept: OBGYN | Age: 37
End: 2022-02-21

## 2022-02-21 ENCOUNTER — DOCUMENTATION (OUTPATIENT)
Dept: OBGYN | Age: 37
End: 2022-02-21

## 2022-03-01 ENCOUNTER — LAB SERVICES (OUTPATIENT)
Dept: LAB | Age: 37
End: 2022-03-01

## 2022-03-01 ENCOUNTER — OFFICE VISIT (OUTPATIENT)
Dept: MATERNAL FETAL MEDICINE | Age: 37
End: 2022-03-01
Attending: OBSTETRICS & GYNECOLOGY

## 2022-03-01 DIAGNOSIS — Z13.79 ENCOUNTER FOR OTHER SCREENING FOR GENETIC AND CHROMOSOMAL ANOMALIES: Primary | ICD-10-CM

## 2022-03-01 LAB — BKR KIT TESTING DISCLAIMER STATEMENT: NORMAL

## 2022-03-01 PROCEDURE — 96040 HB GENETIC COUNSELING EACH 30 MIN: CPT | Performed by: GENETIC COUNSELOR, MS

## 2022-03-07 ENCOUNTER — LAB SERVICES (OUTPATIENT)
Dept: LAB | Age: 37
End: 2022-03-07

## 2022-03-07 ENCOUNTER — OB CHECK (OUTPATIENT)
Dept: OBGYN | Age: 37
End: 2022-03-07

## 2022-03-07 VITALS
DIASTOLIC BLOOD PRESSURE: 59 MMHG | TEMPERATURE: 97.2 F | BODY MASS INDEX: 23.47 KG/M2 | SYSTOLIC BLOOD PRESSURE: 99 MMHG | HEART RATE: 64 BPM | OXYGEN SATURATION: 100 % | WEIGHT: 140.87 LBS | RESPIRATION RATE: 14 BRPM | HEIGHT: 65 IN

## 2022-03-07 DIAGNOSIS — Z3A.17 17 WEEKS GESTATION OF PREGNANCY: ICD-10-CM

## 2022-03-07 DIAGNOSIS — Z3A.17 17 WEEKS GESTATION OF PREGNANCY: Primary | ICD-10-CM

## 2022-03-07 PROBLEM — Z34.90 PREGNANCY: Status: ACTIVE | Noted: 2022-03-07

## 2022-03-07 LAB
APPEARANCE, POC: CLEAR
BILIRUBIN, POC: NEGATIVE
COLOR, POC: YELLOW
GLUCOSE UR-MCNC: NEGATIVE MG/DL
KETONES, POC: NEGATIVE MG/DL
NITRITE, POC: NEGATIVE
OCCULT BLOOD, POC: NEGATIVE
PH UR: 7.5 [PH] (ref 5–7)
PROT UR-MCNC: NEGATIVE MG/DL
SP GR UR: 1.02 (ref 1–1.03)
UROBILINOGEN UR-MCNC: 0.2 MG/DL (ref 0–1)
WBC (LEUKOCYTE) ESTERASE, POC: NEGATIVE

## 2022-03-07 PROCEDURE — 82105 ALPHA-FETOPROTEIN SERUM: CPT | Performed by: PSYCHIATRY & NEUROLOGY

## 2022-03-07 PROCEDURE — 36415 COLL VENOUS BLD VENIPUNCTURE: CPT | Performed by: PSYCHIATRY & NEUROLOGY

## 2022-03-07 PROCEDURE — 87624 HPV HI-RISK TYP POOLED RSLT: CPT | Performed by: PSYCHIATRY & NEUROLOGY

## 2022-03-07 PROCEDURE — 88175 CYTOPATH C/V AUTO FLUID REDO: CPT | Performed by: PSYCHIATRY & NEUROLOGY

## 2022-03-07 PROCEDURE — 0502F SUBSEQUENT PRENATAL CARE: CPT

## 2022-03-07 ASSESSMENT — PAIN SCALES - GENERAL: PAINLEVEL: 0

## 2022-03-08 ENCOUNTER — TELEPHONE (OUTPATIENT)
Dept: OBGYN | Age: 37
End: 2022-03-08

## 2022-03-08 ENCOUNTER — TELEPHONE (OUTPATIENT)
Dept: MATERNAL FETAL MEDICINE | Age: 37
End: 2022-03-08

## 2022-03-08 DIAGNOSIS — Z3A.17 17 WEEKS GESTATION OF PREGNANCY: Primary | ICD-10-CM

## 2022-03-08 LAB
# FETUSES US: NORMAL
AFP MOM SERPL: 0.71 MOM
AFP SERPL-MCNC: 29.5 NG/ML
AGE AT DELIVERY: 35.12
GA: NORMAL WK
HX OF TRISOMY 21 NARR: NO
IDDM PATIENT QL: NO
METHOD BEST OVERALL GA ESTIMATE: NORMAL
NEURAL TUBE DEFECT RISK FETUS: NORMAL %
SERVICE CMNT-IMP: NORMAL
SERVICE CMNT-IMP: NORMAL
TS 21 RISK FETUS: NORMAL

## 2022-03-11 LAB
CASE RPRT: NORMAL
CLINICAL INFO: NORMAL
CYTOLOGY CVX/VAG STUDY: NORMAL
HPV16+18+45 E6+E7MRNA CVX NAA+PROBE: NEGATIVE
Lab: NORMAL
PAP EDUCATIONAL NOTE: NORMAL
SPECIMEN ADEQUACY: NORMAL

## 2022-03-28 ENCOUNTER — OFFICE VISIT (OUTPATIENT)
Dept: MATERNAL FETAL MEDICINE | Age: 37
End: 2022-03-28

## 2022-03-28 DIAGNOSIS — Z3A.20 20 WEEKS GESTATION OF PREGNANCY: Primary | ICD-10-CM

## 2022-03-28 PROCEDURE — 76811 OB US DETAILED SNGL FETUS: CPT | Performed by: OBSTETRICS & GYNECOLOGY

## 2022-03-28 PROCEDURE — 76817 TRANSVAGINAL US OBSTETRIC: CPT | Performed by: OBSTETRICS & GYNECOLOGY

## 2022-04-04 ENCOUNTER — OB CHECK (OUTPATIENT)
Dept: OBGYN | Age: 37
End: 2022-04-04

## 2022-04-04 VITALS
BODY MASS INDEX: 24.66 KG/M2 | WEIGHT: 148.04 LBS | DIASTOLIC BLOOD PRESSURE: 56 MMHG | OXYGEN SATURATION: 98 % | HEIGHT: 65 IN | SYSTOLIC BLOOD PRESSURE: 89 MMHG | HEART RATE: 69 BPM | RESPIRATION RATE: 12 BRPM | TEMPERATURE: 98.2 F

## 2022-04-04 DIAGNOSIS — Z76.89 ENCOUNTER TO ESTABLISH CARE: ICD-10-CM

## 2022-04-04 DIAGNOSIS — Z3A.21 21 WEEKS GESTATION OF PREGNANCY: Primary | ICD-10-CM

## 2022-04-04 LAB
APPEARANCE, POC: CLEAR
BILIRUBIN, POC: NEGATIVE
COLOR, POC: YELLOW
GLUCOSE UR-MCNC: NEGATIVE MG/DL
KETONES, POC: NEGATIVE MG/DL
NITRITE, POC: NEGATIVE
OCCULT BLOOD, POC: NEGATIVE
PH UR: 7 [PH] (ref 5–7)
PROT UR-MCNC: NEGATIVE MG/DL
SP GR UR: 1.01 (ref 1–1.03)
UROBILINOGEN UR-MCNC: 0.2 MG/DL (ref 0–1)
WBC (LEUKOCYTE) ESTERASE, POC: NEGATIVE

## 2022-04-04 PROCEDURE — 81002 URINALYSIS NONAUTO W/O SCOPE: CPT

## 2022-04-04 PROCEDURE — 0502F SUBSEQUENT PRENATAL CARE: CPT

## 2022-04-04 ASSESSMENT — PAIN SCALES - GENERAL: PAINLEVEL: 0

## 2022-05-02 ENCOUNTER — OB CHECK (OUTPATIENT)
Dept: OBGYN | Age: 37
End: 2022-05-02

## 2022-05-02 VITALS
DIASTOLIC BLOOD PRESSURE: 59 MMHG | WEIGHT: 151.46 LBS | OXYGEN SATURATION: 100 % | BODY MASS INDEX: 25.23 KG/M2 | SYSTOLIC BLOOD PRESSURE: 96 MMHG | RESPIRATION RATE: 16 BRPM | HEIGHT: 65 IN | HEART RATE: 64 BPM | TEMPERATURE: 97.5 F

## 2022-05-02 DIAGNOSIS — Z34.80 PREGNANCY IN MULTIGRAVIDA: ICD-10-CM

## 2022-05-02 DIAGNOSIS — Z3A.25 25 WEEKS GESTATION OF PREGNANCY: Primary | ICD-10-CM

## 2022-05-02 LAB
APPEARANCE, POC: CLEAR
BILIRUBIN, POC: NEGATIVE
COLOR, POC: YELLOW
GLUCOSE UR-MCNC: NEGATIVE MG/DL
KETONES, POC: NEGATIVE MG/DL
NITRITE, POC: NEGATIVE
OCCULT BLOOD, POC: NEGATIVE
PH UR: 7 [PH] (ref 5–7)
PROT UR-MCNC: NEGATIVE MG/DL
SP GR UR: 1.02 (ref 1–1.03)
UROBILINOGEN UR-MCNC: 0.2 MG/DL (ref 0–1)
WBC (LEUKOCYTE) ESTERASE, POC: NEGATIVE

## 2022-05-02 PROCEDURE — 0502F SUBSEQUENT PRENATAL CARE: CPT

## 2022-05-02 ASSESSMENT — PAIN SCALES - GENERAL: PAINLEVEL: 0

## 2022-05-23 ENCOUNTER — LAB SERVICES (OUTPATIENT)
Dept: LAB | Age: 37
End: 2022-05-23

## 2022-05-23 ENCOUNTER — OB CHECK (OUTPATIENT)
Dept: OBGYN | Age: 37
End: 2022-05-23

## 2022-05-23 VITALS
TEMPERATURE: 98.1 F | SYSTOLIC BLOOD PRESSURE: 98 MMHG | BODY MASS INDEX: 25.77 KG/M2 | HEIGHT: 65 IN | HEART RATE: 67 BPM | WEIGHT: 154.65 LBS | RESPIRATION RATE: 14 BRPM | DIASTOLIC BLOOD PRESSURE: 64 MMHG

## 2022-05-23 DIAGNOSIS — Z34.80 PREGNANCY IN MULTIGRAVIDA: ICD-10-CM

## 2022-05-23 DIAGNOSIS — R73.09 ELEVATED GLUCOSE TOLERANCE TEST: ICD-10-CM

## 2022-05-23 DIAGNOSIS — Z3A.28 28 WEEKS GESTATION OF PREGNANCY: Primary | ICD-10-CM

## 2022-05-23 PROCEDURE — 87389 HIV-1 AG W/HIV-1&-2 AB AG IA: CPT | Performed by: INTERNAL MEDICINE

## 2022-05-23 PROCEDURE — 0502F SUBSEQUENT PRENATAL CARE: CPT

## 2022-05-23 PROCEDURE — 90715 TDAP VACCINE 7 YRS/> IM: CPT

## 2022-05-23 PROCEDURE — 90471 IMMUNIZATION ADMIN: CPT

## 2022-05-23 PROCEDURE — 82950 GLUCOSE TEST: CPT | Performed by: INTERNAL MEDICINE

## 2022-05-23 PROCEDURE — 86780 TREPONEMA PALLIDUM: CPT | Performed by: INTERNAL MEDICINE

## 2022-05-23 PROCEDURE — 85027 COMPLETE CBC AUTOMATED: CPT | Performed by: INTERNAL MEDICINE

## 2022-05-23 PROCEDURE — 36415 COLL VENOUS BLD VENIPUNCTURE: CPT | Performed by: INTERNAL MEDICINE

## 2022-05-23 ASSESSMENT — EDINBURGH POSTNATAL DEPRESSION SCALE (EPDS)
I HAVE BLAMED MYSELF UNNECESSARILY WHEN THINGS WENT WRONG: NOT VERY OFTEN
I HAVE FELT SAD OR MISERABLE: NO, NOT AT ALL
I HAVE BEEN SO UNHAPPY THAT I HAVE BEEN CRYING: NO, NEVER
I HAVE FELT SCARED OR PANICKY FOR NO GOOD REASON: NO, NOT AT ALL
TOTAL SCORE: 1
I HAVE BEEN ANXIOUS OR WORRIED FOR NO GOOD REASON: NO, NOT AT ALL
THE THOUGHT OF HARMING MYSELF HAS OCCURRED TO ME: NEVER
I HAVE BEEN ABLE TO LAUGH AND SEE THE FUNNY SIDE OF THINGS: AS MUCH AS I ALWAYS COULD
I HAVE LOOKED FORWARD WITH ENJOYMENT TO THINGS: AS MUCH AS I EVER DID
I HAVE BEEN SO UNHAPPY THAT I HAVE HAD DIFFICULTY SLEEPING: NOT AT ALL
THINGS HAVE BEEN GETTING ON TOP OF ME: NO, I HAVE BEEN COPING AS WELL AS EVER

## 2022-05-23 ASSESSMENT — PAIN SCALES - GENERAL: PAINLEVEL: 0

## 2022-05-24 LAB
DEPRECATED RDW RBC: 40.1 FL (ref 39–50)
ERYTHROCYTE [DISTWIDTH] IN BLOOD: 12 % (ref 11–15)
GLUCOSE 1H P 50 G GLC PO SERPL-MCNC: 141 MG/DL (ref 65–139)
HCT VFR BLD CALC: 34 % (ref 36–46.5)
HGB BLD-MCNC: 11.7 G/DL (ref 12–15.5)
HIV 1+2 AB+HIV1 P24 AG SERPL QL IA: NONREACTIVE
MCH RBC QN AUTO: 31.5 PG (ref 26–34)
MCHC RBC AUTO-ENTMCNC: 34.4 G/DL (ref 32–36.5)
MCV RBC AUTO: 91.6 FL (ref 78–100)
NRBC BLD MANUAL-RTO: 0 /100 WBC
PLATELET # BLD AUTO: 311 K/MCL (ref 140–450)
RBC # BLD: 3.71 MIL/MCL (ref 4–5.2)
T PALLIDUM IGG SER QL IA: NONREACTIVE
WBC # BLD: 6.6 K/MCL (ref 4.2–11)

## 2022-05-25 ENCOUNTER — DOCUMENTATION (OUTPATIENT)
Dept: OBGYN | Age: 37
End: 2022-05-25

## 2022-05-25 DIAGNOSIS — R73.09 ELEVATED GLUCOSE TOLERANCE TEST: Primary | ICD-10-CM

## 2022-05-26 LAB
FASTING DURATION TIME PATIENT: 12 HOURS (ref 0–999)
GLUCOSE 1H P 100 G GLC PO SERPL-MCNC: 142 MG/DL (ref 65–179)
GLUCOSE 2H P 100 G GLC PO SERPL-MCNC: 117 MG/DL (ref 65–154)
GLUCOSE 3H P 100 G GLC PO SERPL-MCNC: 108 MG/DL (ref 65–139)
GLUCOSE P FAST SERPL-MCNC: 84 MG/DL (ref 65–99)

## 2022-05-26 PROCEDURE — 82951 GLUCOSE TOLERANCE TEST (GTT): CPT | Performed by: INTERNAL MEDICINE

## 2022-05-26 PROCEDURE — 36415 COLL VENOUS BLD VENIPUNCTURE: CPT | Performed by: INTERNAL MEDICINE

## 2022-05-26 PROCEDURE — 82952 GTT-ADDED SAMPLES: CPT | Performed by: INTERNAL MEDICINE

## 2022-06-06 ENCOUNTER — OB CHECK (OUTPATIENT)
Dept: OBGYN | Age: 37
End: 2022-06-06

## 2022-06-06 VITALS
DIASTOLIC BLOOD PRESSURE: 54 MMHG | WEIGHT: 156.68 LBS | TEMPERATURE: 98.2 F | HEART RATE: 67 BPM | RESPIRATION RATE: 12 BRPM | SYSTOLIC BLOOD PRESSURE: 92 MMHG | HEIGHT: 65 IN | OXYGEN SATURATION: 98 % | BODY MASS INDEX: 26.1 KG/M2

## 2022-06-06 DIAGNOSIS — Z3A.30 30 WEEKS GESTATION OF PREGNANCY: Primary | ICD-10-CM

## 2022-06-06 LAB
APPEARANCE, POC: CLEAR
BILIRUBIN, POC: NEGATIVE
COLOR, POC: YELLOW
GLUCOSE UR-MCNC: NEGATIVE MG/DL
KETONES, POC: NEGATIVE MG/DL
NITRITE, POC: NEGATIVE
OCCULT BLOOD, POC: NEGATIVE
PH UR: 7.5 [PH] (ref 5–7)
PROT UR-MCNC: NEGATIVE MG/DL
SP GR UR: 1.01 (ref 1–1.03)
UROBILINOGEN UR-MCNC: 0.2 MG/DL (ref 0–1)
WBC (LEUKOCYTE) ESTERASE, POC: NEGATIVE

## 2022-06-06 PROCEDURE — 0502F SUBSEQUENT PRENATAL CARE: CPT

## 2022-06-06 ASSESSMENT — PAIN SCALES - GENERAL: PAINLEVEL: 0

## 2022-06-13 ENCOUNTER — OFFICE VISIT (OUTPATIENT)
Dept: INTERNAL MEDICINE | Age: 37
End: 2022-06-13

## 2022-06-13 VITALS
HEIGHT: 63 IN | SYSTOLIC BLOOD PRESSURE: 104 MMHG | DIASTOLIC BLOOD PRESSURE: 63 MMHG | RESPIRATION RATE: 12 BRPM | HEART RATE: 66 BPM | WEIGHT: 159.5 LBS | BODY MASS INDEX: 28.26 KG/M2 | OXYGEN SATURATION: 99 % | TEMPERATURE: 97.3 F

## 2022-06-13 DIAGNOSIS — Z76.89 ENCOUNTER TO ESTABLISH CARE WITH NEW DOCTOR: Primary | ICD-10-CM

## 2022-06-13 DIAGNOSIS — Z00.00 HEALTHCARE MAINTENANCE: ICD-10-CM

## 2022-06-13 DIAGNOSIS — Z3A.20 20 WEEKS GESTATION OF PREGNANCY: ICD-10-CM

## 2022-06-13 DIAGNOSIS — R73.09 ELEVATED GLUCOSE TOLERANCE TEST: ICD-10-CM

## 2022-06-13 PROBLEM — N63.0 LUMP OR MASS IN BREAST: Status: ACTIVE | Noted: 2018-02-21

## 2022-06-13 PROBLEM — N63.0 LUMP OR MASS IN BREAST: Status: RESOLVED | Noted: 2018-02-21 | Resolved: 2022-06-13

## 2022-06-13 PROCEDURE — 99203 OFFICE O/P NEW LOW 30 MIN: CPT | Performed by: INTERNAL MEDICINE

## 2022-06-13 ASSESSMENT — ENCOUNTER SYMPTOMS
NUMBNESS: 0
NAUSEA: 0
CHILLS: 0
WEAKNESS: 0
TROUBLE SWALLOWING: 0
COUGH: 0
CONSTIPATION: 0
SHORTNESS OF BREATH: 0
FEVER: 0
ABDOMINAL PAIN: 0
DIARRHEA: 0

## 2022-06-13 ASSESSMENT — PATIENT HEALTH QUESTIONNAIRE - PHQ9
1. LITTLE INTEREST OR PLEASURE IN DOING THINGS: NOT AT ALL
SUM OF ALL RESPONSES TO PHQ9 QUESTIONS 1 AND 2: 0
2. FEELING DOWN, DEPRESSED OR HOPELESS: NOT AT ALL
SUM OF ALL RESPONSES TO PHQ9 QUESTIONS 1 AND 2: 0
CLINICAL INTERPRETATION OF PHQ2 SCORE: NO FURTHER SCREENING NEEDED

## 2022-06-20 ENCOUNTER — OB CHECK (OUTPATIENT)
Dept: OBGYN | Age: 37
End: 2022-06-20

## 2022-06-20 VITALS
BODY MASS INDEX: 26.15 KG/M2 | HEIGHT: 66 IN | TEMPERATURE: 98.8 F | WEIGHT: 162.7 LBS | SYSTOLIC BLOOD PRESSURE: 97 MMHG | OXYGEN SATURATION: 97 % | DIASTOLIC BLOOD PRESSURE: 58 MMHG | RESPIRATION RATE: 12 BRPM | HEART RATE: 80 BPM

## 2022-06-20 DIAGNOSIS — Z3A.32 32 WEEKS GESTATION OF PREGNANCY: Primary | ICD-10-CM

## 2022-06-20 PROCEDURE — 0502F SUBSEQUENT PRENATAL CARE: CPT

## 2022-06-20 ASSESSMENT — PAIN SCALES - GENERAL: PAINLEVEL: 0

## 2022-07-11 ENCOUNTER — OB CHECK (OUTPATIENT)
Dept: OBGYN | Age: 37
End: 2022-07-11

## 2022-07-11 VITALS
RESPIRATION RATE: 20 BRPM | WEIGHT: 165.12 LBS | HEIGHT: 63 IN | OXYGEN SATURATION: 99 % | SYSTOLIC BLOOD PRESSURE: 100 MMHG | TEMPERATURE: 98.3 F | BODY MASS INDEX: 29.26 KG/M2 | HEART RATE: 86 BPM | DIASTOLIC BLOOD PRESSURE: 58 MMHG

## 2022-07-11 DIAGNOSIS — O09.523 MULTIGRAVIDA OF ADVANCED MATERNAL AGE IN THIRD TRIMESTER: ICD-10-CM

## 2022-07-11 DIAGNOSIS — Z3A.35 35 WEEKS GESTATION OF PREGNANCY: Primary | ICD-10-CM

## 2022-07-11 PROBLEM — O09.529 AMA (ADVANCED MATERNAL AGE) MULTIGRAVIDA 35+: Status: ACTIVE | Noted: 2022-07-11

## 2022-07-11 LAB
APPEARANCE, POC: CLEAR
BILIRUBIN, POC: NEGATIVE
COLOR, POC: YELLOW
GLUCOSE UR-MCNC: NEGATIVE MG/DL
KETONES, POC: NORMAL MG/DL
NITRITE, POC: NEGATIVE
OCCULT BLOOD, POC: NEGATIVE
PH UR: 7 [PH] (ref 5–7)
PROT UR-MCNC: NEGATIVE MG/DL
SP GR UR: 1.02 (ref 1–1.03)
UROBILINOGEN UR-MCNC: 0.2 MG/DL (ref 0–1)
WBC (LEUKOCYTE) ESTERASE, POC: NEGATIVE

## 2022-07-11 PROCEDURE — 0502F SUBSEQUENT PRENATAL CARE: CPT

## 2022-07-11 ASSESSMENT — LIFESTYLE VARIABLES
IN THE PAST MONTH, DID YOU DRINK BEER, WINE OR LIQUOR, OR USE OTHER DRUGS? (PREGNANCY): NO
DID ANY OF YOUR PARENTS HAVE PROBLEMS WITH ALCOHOL OR DRUG USE?: NO
DOES YOUR PARTNER HAVE A PROBLEM WITH ALCOHOL OR DRUG USE?: NO
BEFORE YOU WERE PREGNANT DID YOU HAVE A PROBLEM WITH ALCOHOL OR DRUG USE? (PAST): NO
DO ANY OF YOUR FRIENDS (PEERS) HAVE PROBLEMS WITH ALCOHOL OR DRUG USE?: NO

## 2022-07-11 ASSESSMENT — PAIN SCALES - GENERAL: PAINLEVEL: 0

## 2022-07-18 ENCOUNTER — OB CHECK (OUTPATIENT)
Dept: OBGYN | Age: 37
End: 2022-07-18

## 2022-07-18 VITALS
SYSTOLIC BLOOD PRESSURE: 99 MMHG | OXYGEN SATURATION: 98 % | BODY MASS INDEX: 29.69 KG/M2 | HEART RATE: 86 BPM | WEIGHT: 167.55 LBS | HEIGHT: 63 IN | DIASTOLIC BLOOD PRESSURE: 71 MMHG | RESPIRATION RATE: 20 BRPM | TEMPERATURE: 98.3 F

## 2022-07-18 DIAGNOSIS — Z3A.36 36 WEEKS GESTATION OF PREGNANCY: Primary | ICD-10-CM

## 2022-07-18 PROCEDURE — 0502F SUBSEQUENT PRENATAL CARE: CPT

## 2022-07-18 PROCEDURE — 87081 CULTURE SCREEN ONLY: CPT | Performed by: INTERNAL MEDICINE

## 2022-07-18 ASSESSMENT — PAIN SCALES - GENERAL: PAINLEVEL: 0

## 2022-07-21 LAB — GP B STREP SPEC QL CULT: NORMAL

## 2022-07-22 ENCOUNTER — OB CHECK (OUTPATIENT)
Dept: MATERNAL FETAL MEDICINE | Age: 37
End: 2022-07-22

## 2022-07-22 DIAGNOSIS — O09.523 MULTIGRAVIDA OF ADVANCED MATERNAL AGE IN THIRD TRIMESTER: Primary | ICD-10-CM

## 2022-07-22 PROCEDURE — 76815 OB US LIMITED FETUS(S): CPT | Performed by: OBSTETRICS & GYNECOLOGY

## 2022-07-22 PROCEDURE — 59025 FETAL NON-STRESS TEST: CPT | Performed by: OBSTETRICS & GYNECOLOGY

## 2022-07-25 ENCOUNTER — OB CHECK (OUTPATIENT)
Dept: OBGYN | Age: 37
End: 2022-07-25

## 2022-07-25 VITALS
OXYGEN SATURATION: 99 % | RESPIRATION RATE: 16 BRPM | SYSTOLIC BLOOD PRESSURE: 99 MMHG | HEIGHT: 63 IN | TEMPERATURE: 97.3 F | HEART RATE: 74 BPM | DIASTOLIC BLOOD PRESSURE: 64 MMHG | BODY MASS INDEX: 29.86 KG/M2 | WEIGHT: 168.54 LBS

## 2022-07-25 DIAGNOSIS — Z3A.37 37 WEEKS GESTATION OF PREGNANCY: ICD-10-CM

## 2022-07-25 DIAGNOSIS — O09.523 MULTIGRAVIDA OF ADVANCED MATERNAL AGE IN THIRD TRIMESTER: Primary | ICD-10-CM

## 2022-07-25 LAB
APPEARANCE, POC: CLEAR
BILIRUBIN, POC: NEGATIVE
COLOR, POC: YELLOW
GLUCOSE UR-MCNC: NEGATIVE MG/DL
KETONES, POC: NEGATIVE MG/DL
NITRITE, POC: NEGATIVE
OCCULT BLOOD, POC: ABNORMAL
PH UR: 7 [PH] (ref 5–7)
PROT UR-MCNC: NEGATIVE MG/DL
SP GR UR: 1.01 (ref 1–1.03)
UROBILINOGEN UR-MCNC: 0.2 MG/DL (ref 0–1)
WBC (LEUKOCYTE) ESTERASE, POC: NEGATIVE

## 2022-07-25 PROCEDURE — 0502F SUBSEQUENT PRENATAL CARE: CPT | Performed by: ADVANCED PRACTICE MIDWIFE

## 2022-07-25 ASSESSMENT — PAIN SCALES - GENERAL: PAINLEVEL: 0

## 2022-07-29 ENCOUNTER — APPOINTMENT (OUTPATIENT)
Dept: MATERNAL FETAL MEDICINE | Age: 37
End: 2022-07-29

## 2022-08-01 ENCOUNTER — OB CHECK (OUTPATIENT)
Dept: OBGYN | Age: 37
End: 2022-08-01

## 2022-08-01 VITALS
HEART RATE: 63 BPM | OXYGEN SATURATION: 99 % | BODY MASS INDEX: 29.96 KG/M2 | TEMPERATURE: 97.5 F | WEIGHT: 170.97 LBS | SYSTOLIC BLOOD PRESSURE: 102 MMHG | DIASTOLIC BLOOD PRESSURE: 62 MMHG | RESPIRATION RATE: 12 BRPM

## 2022-08-01 DIAGNOSIS — Z3A.38 38 WEEKS GESTATION OF PREGNANCY: Primary | ICD-10-CM

## 2022-08-01 LAB
APPEARANCE, POC: CLEAR
BILIRUBIN, POC: NEGATIVE
COLOR, POC: YELLOW
GLUCOSE UR-MCNC: NEGATIVE MG/DL
KETONES, POC: NEGATIVE MG/DL
NITRITE, POC: NEGATIVE
OCCULT BLOOD, POC: NEGATIVE
PH UR: 7 [PH] (ref 5–7)
PROT UR-MCNC: NEGATIVE MG/DL
SP GR UR: 1.01 (ref 1–1.03)
UROBILINOGEN UR-MCNC: 0.2 MG/DL (ref 0–1)
WBC (LEUKOCYTE) ESTERASE, POC: ABNORMAL

## 2022-08-01 PROCEDURE — 0502F SUBSEQUENT PRENATAL CARE: CPT | Performed by: ADVANCED PRACTICE MIDWIFE

## 2022-08-01 ASSESSMENT — PAIN SCALES - GENERAL: PAINLEVEL: 0

## 2022-08-08 ENCOUNTER — OB CHECK (OUTPATIENT)
Dept: OBGYN | Age: 37
End: 2022-08-08

## 2022-08-08 ENCOUNTER — TELEPHONE (OUTPATIENT)
Dept: OBGYN | Age: 37
End: 2022-08-08

## 2022-08-08 VITALS
DIASTOLIC BLOOD PRESSURE: 65 MMHG | SYSTOLIC BLOOD PRESSURE: 106 MMHG | WEIGHT: 170.19 LBS | RESPIRATION RATE: 12 BRPM | BODY MASS INDEX: 30.16 KG/M2 | OXYGEN SATURATION: 98 % | HEART RATE: 74 BPM | TEMPERATURE: 97.6 F | HEIGHT: 63 IN

## 2022-08-08 DIAGNOSIS — Z3A.39 39 WEEKS GESTATION OF PREGNANCY: Primary | ICD-10-CM

## 2022-08-08 DIAGNOSIS — O09.523 MULTIGRAVIDA OF ADVANCED MATERNAL AGE IN THIRD TRIMESTER: ICD-10-CM

## 2022-08-08 DIAGNOSIS — O48.0 POST-TERM PREGNANCY, 40-42 WEEKS OF GESTATION: ICD-10-CM

## 2022-08-08 PROCEDURE — 0502F SUBSEQUENT PRENATAL CARE: CPT | Performed by: ADVANCED PRACTICE MIDWIFE

## 2022-08-08 ASSESSMENT — PAIN SCALES - GENERAL: PAINLEVEL: 0

## 2022-08-09 ENCOUNTER — TELEPHONE (OUTPATIENT)
Dept: OBGYN | Age: 37
End: 2022-08-09

## 2022-08-15 ENCOUNTER — OB CHECK (OUTPATIENT)
Dept: OBGYN | Age: 37
End: 2022-08-15

## 2022-08-15 VITALS
SYSTOLIC BLOOD PRESSURE: 104 MMHG | OXYGEN SATURATION: 99 % | DIASTOLIC BLOOD PRESSURE: 63 MMHG | TEMPERATURE: 98.2 F | BODY MASS INDEX: 30.43 KG/M2 | WEIGHT: 171.74 LBS | HEART RATE: 76 BPM | RESPIRATION RATE: 14 BRPM | HEIGHT: 63 IN

## 2022-08-15 DIAGNOSIS — Z3A.40 40 WEEKS GESTATION OF PREGNANCY: Primary | ICD-10-CM

## 2022-08-15 LAB
APPEARANCE, POC: CLEAR
BILIRUBIN, POC: NEGATIVE
COLOR, POC: NORMAL
GLUCOSE UR-MCNC: NEGATIVE MG/DL
KETONES, POC: NEGATIVE MG/DL
NITRITE, POC: NEGATIVE
OCCULT BLOOD, POC: NEGATIVE
PH UR: 6.5 [PH] (ref 5–7)
PROT UR-MCNC: NEGATIVE MG/DL
SP GR UR: 1.01 (ref 1–1.03)
UROBILINOGEN UR-MCNC: 0.2 MG/DL (ref 0–1)
WBC (LEUKOCYTE) ESTERASE, POC: NEGATIVE

## 2022-08-15 PROCEDURE — 0502F SUBSEQUENT PRENATAL CARE: CPT

## 2022-08-15 ASSESSMENT — PAIN SCALES - GENERAL: PAINLEVEL: 0

## 2022-08-16 ENCOUNTER — DOCUMENTATION (OUTPATIENT)
Dept: OBGYN | Age: 37
End: 2022-08-16

## 2022-08-16 ENCOUNTER — APPOINTMENT (OUTPATIENT)
Dept: MATERNAL FETAL MEDICINE | Age: 37
End: 2022-08-16

## 2022-08-16 DIAGNOSIS — O48.0 POST TERM PREGNANCY, ANTEPARTUM CONDITION OR COMPLICATION: Primary | ICD-10-CM

## 2022-08-19 ENCOUNTER — OB CHECK (OUTPATIENT)
Dept: MATERNAL FETAL MEDICINE | Age: 37
End: 2022-08-19
Attending: ADVANCED PRACTICE MIDWIFE

## 2022-08-19 DIAGNOSIS — O09.523 MULTIGRAVIDA OF ADVANCED MATERNAL AGE IN THIRD TRIMESTER: Primary | ICD-10-CM

## 2022-08-19 DIAGNOSIS — O48.0 POST-TERM PREGNANCY, 40-42 WEEKS OF GESTATION: ICD-10-CM

## 2022-08-19 PROCEDURE — 76815 OB US LIMITED FETUS(S): CPT | Performed by: OBSTETRICS & GYNECOLOGY

## 2022-08-19 PROCEDURE — 59025 FETAL NON-STRESS TEST: CPT | Performed by: OBSTETRICS & GYNECOLOGY

## 2022-08-22 ENCOUNTER — OB CHECK (OUTPATIENT)
Dept: OBGYN | Age: 37
End: 2022-08-22

## 2022-08-22 VITALS
OXYGEN SATURATION: 100 % | HEIGHT: 63 IN | RESPIRATION RATE: 16 BRPM | DIASTOLIC BLOOD PRESSURE: 69 MMHG | TEMPERATURE: 98.7 F | SYSTOLIC BLOOD PRESSURE: 104 MMHG | BODY MASS INDEX: 30.64 KG/M2 | HEART RATE: 71 BPM | WEIGHT: 172.95 LBS

## 2022-08-22 DIAGNOSIS — Z3A.41 41 WEEKS GESTATION OF PREGNANCY: Primary | ICD-10-CM

## 2022-08-22 DIAGNOSIS — Z01.818 PRE-PROCEDURAL EXAMINATION: ICD-10-CM

## 2022-08-22 DIAGNOSIS — O48.0 41 WEEKS GESTATION OF PREGNANCY: Primary | ICD-10-CM

## 2022-08-22 PROCEDURE — 59426 ANTEPARTUM CARE ONLY: CPT | Performed by: ADVANCED PRACTICE MIDWIFE

## 2022-08-22 ASSESSMENT — PAIN SCALES - GENERAL: PAINLEVEL: 0

## 2022-08-23 ENCOUNTER — APPOINTMENT (OUTPATIENT)
Dept: MATERNAL FETAL MEDICINE | Age: 37
End: 2022-08-23
Attending: ADVANCED PRACTICE MIDWIFE

## 2022-08-23 ENCOUNTER — HOSPITAL ENCOUNTER (INPATIENT)
Age: 37
LOS: 2 days | Discharge: HOME | End: 2022-08-25
Attending: OBSTETRICS & GYNECOLOGY | Admitting: OBSTETRICS & GYNECOLOGY

## 2022-08-23 ENCOUNTER — APPOINTMENT (OUTPATIENT)
Dept: OBGYN | Age: 37
End: 2022-08-23

## 2022-08-23 PROBLEM — Z34.83 MULTIGRAVIDA IN THIRD TRIMESTER: Status: ACTIVE | Noted: 2022-08-23

## 2022-08-23 LAB
ABO + RH BLD: NORMAL
BASOPHILS # BLD: 0 K/MCL (ref 0–0.3)
BASOPHILS NFR BLD: 0 %
BLD GP AB SCN SERPL QL GEL: NEGATIVE
DEPRECATED RDW RBC: 42.1 FL (ref 39–50)
EOSINOPHIL # BLD: 0.1 K/MCL (ref 0–0.5)
EOSINOPHIL NFR BLD: 1 %
ERYTHROCYTE [DISTWIDTH] IN BLOOD: 13.6 % (ref 11–15)
HCT VFR BLD CALC: 33.2 % (ref 36–46.5)
HGB BLD-MCNC: 11.7 G/DL (ref 12–15.5)
IMM GRANULOCYTES # BLD AUTO: 0 K/MCL (ref 0–0.2)
IMM GRANULOCYTES # BLD: 1 %
LYMPHOCYTES # BLD: 1.6 K/MCL (ref 1–4.8)
LYMPHOCYTES NFR BLD: 21 %
MCH RBC QN AUTO: 30.2 PG (ref 26–34)
MCHC RBC AUTO-ENTMCNC: 35.2 G/DL (ref 32–36.5)
MCV RBC AUTO: 85.6 FL (ref 78–100)
MONOCYTES # BLD: 0.7 K/MCL (ref 0.3–0.9)
MONOCYTES NFR BLD: 9 %
NEUTROPHILS # BLD: 5 K/MCL (ref 1.8–7.7)
NEUTROPHILS NFR BLD: 68 %
NRBC BLD MANUAL-RTO: 0 /100 WBC
PLATELET # BLD AUTO: 277 K/MCL (ref 140–450)
RBC # BLD: 3.88 MIL/MCL (ref 4–5.2)
SARS-COV-2 N GENE CT SPEC QN NAA N2: 26.6
SARS-COV-2 RNA RESP QL NAA+PROBE: DETECTED
SERVICE CMNT-IMP: ABNORMAL
TYPE AND SCREEN EXPIRATION DATE: NORMAL
WBC # BLD: 7.4 K/MCL (ref 4.2–11)

## 2022-08-23 PROCEDURE — 13003286 HB ROOM CHARGE L&D

## 2022-08-23 PROCEDURE — 10002807 HB RX 258: Performed by: ADVANCED PRACTICE MIDWIFE

## 2022-08-23 PROCEDURE — 3E0D7GC INTRODUCTION OF OTHER THERAPEUTIC SUBSTANCE INTO MOUTH AND PHARYNX, VIA NATURAL OR ARTIFICIAL OPENING: ICD-10-PCS | Performed by: ADVANCED PRACTICE MIDWIFE

## 2022-08-23 PROCEDURE — 87081 CULTURE SCREEN ONLY: CPT | Performed by: ADVANCED PRACTICE MIDWIFE

## 2022-08-23 PROCEDURE — 86850 RBC ANTIBODY SCREEN: CPT | Performed by: ADVANCED PRACTICE MIDWIFE

## 2022-08-23 PROCEDURE — 10002803 HB RX 637: Performed by: ADVANCED PRACTICE MIDWIFE

## 2022-08-23 PROCEDURE — 85025 COMPLETE CBC W/AUTO DIFF WBC: CPT | Performed by: ADVANCED PRACTICE MIDWIFE

## 2022-08-23 PROCEDURE — 87635 SARS-COV-2 COVID-19 AMP PRB: CPT | Performed by: ADVANCED PRACTICE MIDWIFE

## 2022-08-23 RX ORDER — SODIUM CHLORIDE, SODIUM LACTATE, POTASSIUM CHLORIDE, CALCIUM CHLORIDE 600; 310; 30; 20 MG/100ML; MG/100ML; MG/100ML; MG/100ML
INJECTION, SOLUTION INTRAVENOUS CONTINUOUS
Status: DISCONTINUED | OUTPATIENT
Start: 2022-08-23 | End: 2022-08-25 | Stop reason: HOSPADM

## 2022-08-23 RX ORDER — OXYTOCIN-SODIUM CHLORIDE 0.9% IV SOLN 30 UNIT/500ML 30-0.9/5 UT/ML-%
0-334 SOLUTION INTRAVENOUS CONTINUOUS
Status: DISCONTINUED | OUTPATIENT
Start: 2022-08-23 | End: 2022-08-24

## 2022-08-23 RX ORDER — OXYTOCIN 10 [USP'U]/ML
10 INJECTION, SOLUTION INTRAMUSCULAR; INTRAVENOUS ONCE
Status: DISCONTINUED | OUTPATIENT
Start: 2022-08-23 | End: 2022-08-24 | Stop reason: CLARIF

## 2022-08-23 RX ORDER — ONDANSETRON 2 MG/ML
4 INJECTION INTRAMUSCULAR; INTRAVENOUS 2 TIMES DAILY PRN
Status: DISCONTINUED | OUTPATIENT
Start: 2022-08-23 | End: 2022-08-24 | Stop reason: SDUPTHER

## 2022-08-23 RX ORDER — CALCIUM CARBONATE 500 MG/1
500 TABLET, CHEWABLE ORAL EVERY 4 HOURS PRN
Status: DISCONTINUED | OUTPATIENT
Start: 2022-08-23 | End: 2022-08-24

## 2022-08-23 RX ORDER — 0.9 % SODIUM CHLORIDE 0.9 %
2 VIAL (ML) INJECTION EVERY 12 HOURS SCHEDULED
Status: DISCONTINUED | OUTPATIENT
Start: 2022-08-23 | End: 2022-08-25 | Stop reason: HOSPADM

## 2022-08-23 RX ORDER — LIDOCAINE HYDROCHLORIDE 10 MG/ML
30 INJECTION, SOLUTION EPIDURAL; INFILTRATION; INTRACAUDAL; PERINEURAL
Status: DISCONTINUED | OUTPATIENT
Start: 2022-08-23 | End: 2022-08-25 | Stop reason: HOSPADM

## 2022-08-23 RX ADMIN — Medication 25 MCG: at 20:32

## 2022-08-23 RX ADMIN — SODIUM CHLORIDE, POTASSIUM CHLORIDE, SODIUM LACTATE AND CALCIUM CHLORIDE: 600; 310; 30; 20 INJECTION, SOLUTION INTRAVENOUS at 19:41

## 2022-08-23 ASSESSMENT — LIFESTYLE VARIABLES
ARE YOU DEAF OR DO YOU HAVE SERIOUS DIFFICULTY  HEARING: NO
CHRONIC/CANCER PAIN PRESENT: NO
SHORT OF BREATH OR FATIGUE WITH ADLS: NO
RECENT DECLINE IN ADLS: NO
RECENTLY LOST WEIGHT WITHOUT TRYING: 0
IS PATIENT ABLE TO COMPLETE ASSESSMENT AT THIS TIME: YES
ADL BEFORE ADMISSION: INDEPENDENT
ARE YOU BLIND OR DO YOU HAVE SERIOUS DIFFICULTY SEEING, EVEN WHEN WEARING GLASSES: NO
ADL NEEDS ASSIST: NO
HAVE YOU BEEN EATING POORLY BECAUSE OF A DECREASED APPETITE: 0

## 2022-08-23 ASSESSMENT — PATIENT HEALTH QUESTIONNAIRE - PHQ9: IS PATIENT ABLE TO COMPLETE PHQ2 OR PHQ9: NO, DEFER TO LATER TIME

## 2022-08-23 ASSESSMENT — PAIN SCALES - GENERAL: PAINLEVEL: 0 - NO PAIN

## 2022-08-23 ASSESSMENT — ACTIVITIES OF DAILY LIVING (ADL): ADL_SCORE: 12

## 2022-08-23 ASSESSMENT — COGNITIVE AND FUNCTIONAL STATUS - GENERAL
BECAUSE OF A PHYSICAL, MENTAL, OR EMOTIONAL CONDITION, DO YOU HAVE DIFFICULTY DOING ERRANDS ALONE: NO
DO YOU HAVE SERIOUS DIFFICULTY WALKING OR CLIMBING STAIRS: NO
DO YOU HAVE DIFFICULTY DRESSING OR BATHING: NO
BECAUSE OF A PHYSICAL, MENTAL, OR EMOTIONAL CONDITION, DO YOU HAVE SERIOUS DIFFICULTY CONCENTRATING, REMEMBERING OR MAKING DECISIONS: NO

## 2022-08-24 ENCOUNTER — APPOINTMENT (OUTPATIENT)
Dept: LAB | Age: 37
End: 2022-08-24

## 2022-08-24 PROCEDURE — 13001456 HB PERINATAL CARE

## 2022-08-24 PROCEDURE — 13000001 HB PHASE II RECOVERY EA 30 MINUTES

## 2022-08-24 PROCEDURE — 10907ZC DRAINAGE OF AMNIOTIC FLUID, THERAPEUTIC FROM PRODUCTS OF CONCEPTION, VIA NATURAL OR ARTIFICIAL OPENING: ICD-10-PCS | Performed by: ADVANCED PRACTICE MIDWIFE

## 2022-08-24 PROCEDURE — 10002803 HB RX 637: Performed by: ADVANCED PRACTICE MIDWIFE

## 2022-08-24 PROCEDURE — 10000003 HB ROOM CHARGE WOMEN'S HEALTH

## 2022-08-24 PROCEDURE — 13001455 HB PERINATAL CARE HIGH RISK

## 2022-08-24 PROCEDURE — 10002800 HB RX 250 W HCPCS: Performed by: ADVANCED PRACTICE MIDWIFE

## 2022-08-24 PROCEDURE — 59410 OBSTETRICAL CARE: CPT | Performed by: ADVANCED PRACTICE MIDWIFE

## 2022-08-24 PROCEDURE — 13003251 HB VAGINAL DELIVERY HIGH RISK

## 2022-08-24 PROCEDURE — 10001788 HB DELIVERY VAGINAL

## 2022-08-24 PROCEDURE — 0HQ9XZZ REPAIR PERINEUM SKIN, EXTERNAL APPROACH: ICD-10-PCS | Performed by: ADVANCED PRACTICE MIDWIFE

## 2022-08-24 PROCEDURE — 88307 TISSUE EXAM BY PATHOLOGIST: CPT | Performed by: OBSTETRICS & GYNECOLOGY

## 2022-08-24 PROCEDURE — 10002807 HB RX 258: Performed by: ADVANCED PRACTICE MIDWIFE

## 2022-08-24 PROCEDURE — 10004651 HB RX, NO CHARGE ITEM: Performed by: ADVANCED PRACTICE MIDWIFE

## 2022-08-24 RX ORDER — IBUPROFEN 600 MG/1
600 TABLET ORAL EVERY 6 HOURS PRN
Status: DISCONTINUED | OUTPATIENT
Start: 2022-08-24 | End: 2022-08-25 | Stop reason: HOSPADM

## 2022-08-24 RX ORDER — AMOXICILLIN 250 MG
2 CAPSULE ORAL 2 TIMES DAILY PRN
Status: DISCONTINUED | OUTPATIENT
Start: 2022-08-24 | End: 2022-08-25 | Stop reason: HOSPADM

## 2022-08-24 RX ORDER — ACETAMINOPHEN 325 MG/1
650 TABLET ORAL EVERY 6 HOURS PRN
Status: DISCONTINUED | OUTPATIENT
Start: 2022-08-24 | End: 2022-08-24 | Stop reason: CLARIF

## 2022-08-24 RX ORDER — OXYTOCIN-SODIUM CHLORIDE 0.9% IV SOLN 30 UNIT/500ML 30-0.9/5 UT/ML-%
0-334 SOLUTION INTRAVENOUS CONTINUOUS
Status: DISCONTINUED | OUTPATIENT
Start: 2022-08-24 | End: 2022-08-25 | Stop reason: HOSPADM

## 2022-08-24 RX ORDER — OXYTOCIN 10 [USP'U]/ML
10 INJECTION, SOLUTION INTRAMUSCULAR; INTRAVENOUS
Status: DISCONTINUED | OUTPATIENT
Start: 2022-08-24 | End: 2022-08-25 | Stop reason: HOSPADM

## 2022-08-24 RX ORDER — CALCIUM CARBONATE 500 MG/1
500 TABLET, CHEWABLE ORAL EVERY 4 HOURS PRN
Status: DISCONTINUED | OUTPATIENT
Start: 2022-08-24 | End: 2022-08-25 | Stop reason: HOSPADM

## 2022-08-24 RX ORDER — SIMETHICONE 125 MG
125 TABLET,CHEWABLE ORAL EVERY 4 HOURS PRN
Status: DISCONTINUED | OUTPATIENT
Start: 2022-08-24 | End: 2022-08-25 | Stop reason: HOSPADM

## 2022-08-24 RX ORDER — ONDANSETRON 2 MG/ML
4 INJECTION INTRAMUSCULAR; INTRAVENOUS 2 TIMES DAILY PRN
Status: DISCONTINUED | OUTPATIENT
Start: 2022-08-24 | End: 2022-08-25 | Stop reason: HOSPADM

## 2022-08-24 RX ORDER — OXYTOCIN/0.9 % SODIUM CHLORIDE 30/500 ML
0-20 PLASTIC BAG, INJECTION (ML) INTRAVENOUS CONTINUOUS
Status: DISCONTINUED | OUTPATIENT
Start: 2022-08-24 | End: 2022-08-24

## 2022-08-24 RX ADMIN — IBUPROFEN 600 MG: 600 TABLET ORAL at 12:59

## 2022-08-24 RX ADMIN — OXYTOCIN-SODIUM CHLORIDE 0.9% IV SOLN 30 UNIT/500ML 334 ML/HR: 30-0.9/5 SOLUTION at 07:53

## 2022-08-24 RX ADMIN — IBUPROFEN 600 MG: 600 TABLET ORAL at 20:08

## 2022-08-24 RX ADMIN — SODIUM CHLORIDE, POTASSIUM CHLORIDE, SODIUM LACTATE AND CALCIUM CHLORIDE 125 ML: 600; 310; 30; 20 INJECTION, SOLUTION INTRAVENOUS at 02:59

## 2022-08-24 RX ADMIN — SODIUM CHLORIDE, PRESERVATIVE FREE 2 ML: 5 INJECTION INTRAVENOUS at 20:09

## 2022-08-24 ASSESSMENT — PAIN SCALES - GENERAL
PAINLEVEL_OUTOF10: 7
PAINLEVEL_OUTOF10: 2
PAINLEVEL_OUTOF10: 2
PAINLEVEL_OUTOF10: 4
PAINLEVEL_OUTOF10: 1
PAINLEVEL_OUTOF10: 1
PAINLEVEL_OUTOF10: 2
PAINLEVEL_OUTOF10: 5
PAINLEVEL_OUTOF10: 2
PAINLEVEL_OUTOF10: 2

## 2022-08-24 ASSESSMENT — PATIENT HEALTH QUESTIONNAIRE - PHQ9
IS PATIENT ABLE TO COMPLETE PHQ2 OR PHQ9: NO, DEFER TO LATER TIME

## 2022-08-25 VITALS
DIASTOLIC BLOOD PRESSURE: 68 MMHG | HEART RATE: 74 BPM | TEMPERATURE: 98.4 F | HEIGHT: 65 IN | BODY MASS INDEX: 28.8 KG/M2 | OXYGEN SATURATION: 98 % | RESPIRATION RATE: 16 BRPM | WEIGHT: 172.84 LBS | SYSTOLIC BLOOD PRESSURE: 104 MMHG

## 2022-08-25 LAB
DEPRECATED RDW RBC: 44.4 FL (ref 39–50)
ERYTHROCYTE [DISTWIDTH] IN BLOOD: 13.9 % (ref 11–15)
GP B STREP SPEC QL CULT: NORMAL
HCT VFR BLD CALC: 31.6 % (ref 36–46.5)
HGB BLD-MCNC: 10.4 G/DL (ref 12–15.5)
MCH RBC QN AUTO: 29.1 PG (ref 26–34)
MCHC RBC AUTO-ENTMCNC: 32.9 G/DL (ref 32–36.5)
MCV RBC AUTO: 88.5 FL (ref 78–100)
NRBC BLD MANUAL-RTO: 0 /100 WBC
PLATELET # BLD AUTO: 233 K/MCL (ref 140–450)
RBC # BLD: 3.57 MIL/MCL (ref 4–5.2)
WBC # BLD: 13.3 K/MCL (ref 4.2–11)

## 2022-08-25 PROCEDURE — 36415 COLL VENOUS BLD VENIPUNCTURE: CPT | Performed by: ADVANCED PRACTICE MIDWIFE

## 2022-08-25 PROCEDURE — 10002803 HB RX 637: Performed by: ADVANCED PRACTICE MIDWIFE

## 2022-08-25 PROCEDURE — 85027 COMPLETE CBC AUTOMATED: CPT | Performed by: ADVANCED PRACTICE MIDWIFE

## 2022-08-25 RX ORDER — IBUPROFEN 600 MG/1
600 TABLET ORAL EVERY 6 HOURS PRN
Qty: 40 TABLET | Refills: 0 | Status: SHIPPED | OUTPATIENT
Start: 2022-08-25 | End: 2022-09-08 | Stop reason: CLARIF

## 2022-08-25 RX ADMIN — IBUPROFEN 600 MG: 600 TABLET ORAL at 02:13

## 2022-08-25 ASSESSMENT — PAIN SCALES - GENERAL
PAINLEVEL_OUTOF10: 1
PAINLEVEL_OUTOF10: 1
PAINLEVEL_OUTOF10: 0

## 2022-08-26 LAB
ASR DISCLAIMER: NORMAL
CASE RPRT: NORMAL
CLINICAL INFO: NORMAL
PATH REPORT.FINAL DX SPEC: NORMAL
PATH REPORT.GROSS SPEC: NORMAL

## 2022-08-31 ENCOUNTER — TELEPHONE (OUTPATIENT)
Dept: SCHEDULING | Age: 37
End: 2022-08-31

## 2022-09-08 ENCOUNTER — APPOINTMENT (OUTPATIENT)
Dept: OBGYN | Age: 37
End: 2022-09-08

## 2022-09-08 ENCOUNTER — V-VISIT (OUTPATIENT)
Dept: OBGYN | Age: 37
End: 2022-09-08

## 2022-09-08 PROCEDURE — X1094 NO CHARGE VISIT: HCPCS | Performed by: ADVANCED PRACTICE MIDWIFE

## 2022-09-08 ASSESSMENT — ENCOUNTER SYMPTOMS
ANAL BLEEDING: 0
SHORTNESS OF BREATH: 0
DIZZINESS: 0
CONSTIPATION: 0
BACK PAIN: 0
ABDOMINAL PAIN: 0
NERVOUS/ANXIOUS: 0
FEVER: 0
FATIGUE: 0
BLOOD IN STOOL: 0
SLEEP DISTURBANCE: 1
CHILLS: 0

## 2022-09-08 ASSESSMENT — PAIN SCALES - GENERAL: PAINLEVEL: 0

## 2022-10-03 ENCOUNTER — POSTPARTUM VISIT (OUTPATIENT)
Dept: OBGYN | Age: 37
End: 2022-10-03

## 2022-10-03 VITALS
HEART RATE: 61 BPM | WEIGHT: 146.61 LBS | SYSTOLIC BLOOD PRESSURE: 106 MMHG | HEIGHT: 63 IN | TEMPERATURE: 97.6 F | DIASTOLIC BLOOD PRESSURE: 67 MMHG | RESPIRATION RATE: 14 BRPM | OXYGEN SATURATION: 97 % | BODY MASS INDEX: 25.98 KG/M2

## 2022-10-03 PROBLEM — Z34.83 MULTIGRAVIDA IN THIRD TRIMESTER: Status: RESOLVED | Noted: 2022-08-23 | Resolved: 2022-10-03

## 2022-10-03 PROBLEM — Z34.90 PREGNANCY (CMD): Status: RESOLVED | Noted: 2022-03-07 | Resolved: 2022-10-03

## 2022-10-03 PROBLEM — R73.09 ELEVATED GLUCOSE TOLERANCE TEST: Status: RESOLVED | Noted: 2022-05-25 | Resolved: 2022-10-03

## 2022-10-03 PROBLEM — O09.529 AMA (ADVANCED MATERNAL AGE) MULTIGRAVIDA 35+ (CMD): Status: RESOLVED | Noted: 2022-07-11 | Resolved: 2022-10-03

## 2022-10-03 PROCEDURE — 0503F POSTPARTUM CARE VISIT: CPT | Performed by: ADVANCED PRACTICE MIDWIFE

## 2022-10-03 RX ORDER — NORETHINDRONE ACETATE AND ETHINYL ESTRADIOL 1; .02 MG/1; MG/1
1 TABLET ORAL DAILY
Qty: 84 TABLET | Refills: 3 | Status: SHIPPED | OUTPATIENT
Start: 2022-10-03 | End: 2023-09-07 | Stop reason: ALTCHOICE

## 2022-10-03 ASSESSMENT — EDINBURGH POSTNATAL DEPRESSION SCALE (EPDS)
I HAVE BEEN ANXIOUS OR WORRIED FOR NO GOOD REASON: NO, NOT AT ALL
I HAVE BEEN SO UNHAPPY THAT I HAVE BEEN CRYING: NO, NEVER
I HAVE LOOKED FORWARD WITH ENJOYMENT TO THINGS: AS MUCH AS I EVER DID
THE THOUGHT OF HARMING MYSELF HAS OCCURRED TO ME: NEVER
I HAVE FELT SAD OR MISERABLE: NO, NOT AT ALL
TOTAL SCORE: 2
I HAVE BEEN SO UNHAPPY THAT I HAVE HAD DIFFICULTY SLEEPING: NOT AT ALL
I HAVE BLAMED MYSELF UNNECESSARILY WHEN THINGS WENT WRONG: NOT VERY OFTEN
I HAVE BEEN ABLE TO LAUGH AND SEE THE FUNNY SIDE OF THINGS: AS MUCH AS I ALWAYS COULD
I HAVE FELT SCARED OR PANICKY FOR NO GOOD REASON: NO, NOT AT ALL
THINGS HAVE BEEN GETTING ON TOP OF ME: NO, MOST OF THE TIME I HAVE COPED QUITE WELL

## 2022-10-03 ASSESSMENT — ENCOUNTER SYMPTOMS
ABDOMINAL PAIN: 0
DIZZINESS: 0
FATIGUE: 0
SLEEP DISTURBANCE: 1
SHORTNESS OF BREATH: 0
NERVOUS/ANXIOUS: 0
CONSTIPATION: 0
ANAL BLEEDING: 0
BLOOD IN STOOL: 0
BACK PAIN: 0
FEVER: 0
CHILLS: 0

## 2022-10-03 ASSESSMENT — PAIN SCALES - GENERAL: PAINLEVEL: 0

## 2022-11-07 ENCOUNTER — TELEPHONE (OUTPATIENT)
Dept: OBGYN | Age: 37
End: 2022-11-07

## 2023-06-05 ENCOUNTER — OFFICE VISIT (OUTPATIENT)
Dept: INTERNAL MEDICINE | Age: 38
End: 2023-06-05

## 2023-06-05 VITALS
TEMPERATURE: 98 F | RESPIRATION RATE: 14 BRPM | HEART RATE: 65 BPM | DIASTOLIC BLOOD PRESSURE: 73 MMHG | SYSTOLIC BLOOD PRESSURE: 112 MMHG | OXYGEN SATURATION: 100 % | HEIGHT: 63 IN | BODY MASS INDEX: 22.54 KG/M2 | WEIGHT: 127.21 LBS

## 2023-06-05 DIAGNOSIS — B30.9 VIRAL CONJUNCTIVITIS: Primary | ICD-10-CM

## 2023-06-05 PROCEDURE — 99213 OFFICE O/P EST LOW 20 MIN: CPT | Performed by: INTERNAL MEDICINE

## 2023-06-05 RX ORDER — OFLOXACIN 3 MG/ML
SOLUTION/ DROPS OPHTHALMIC
Qty: 5 ML | Refills: 0 | Status: CANCELLED | OUTPATIENT
Start: 2023-06-05

## 2023-06-05 RX ORDER — KETOTIFEN FUMARATE 0.25 MG/ML
1 SOLUTION/ DROPS OPHTHALMIC 2 TIMES DAILY
Qty: 5 ML | Refills: 1 | Status: SHIPPED | OUTPATIENT
Start: 2023-06-05 | End: 2023-09-07 | Stop reason: HOSPADM

## 2023-06-05 ASSESSMENT — PATIENT HEALTH QUESTIONNAIRE - PHQ9
1. LITTLE INTEREST OR PLEASURE IN DOING THINGS: NOT AT ALL
SUM OF ALL RESPONSES TO PHQ9 QUESTIONS 1 AND 2: 0
SUM OF ALL RESPONSES TO PHQ9 QUESTIONS 1 AND 2: 0
CLINICAL INTERPRETATION OF PHQ2 SCORE: NO FURTHER SCREENING NEEDED
2. FEELING DOWN, DEPRESSED OR HOPELESS: NOT AT ALL

## 2023-06-05 ASSESSMENT — VISUAL ACUITY: OU: 1

## 2023-06-08 ENCOUNTER — E-ADVICE (OUTPATIENT)
Dept: INTERNAL MEDICINE | Age: 38
End: 2023-06-08

## 2023-06-08 DIAGNOSIS — H10.9 BACTERIAL CONJUNCTIVITIS: Primary | ICD-10-CM

## 2023-06-09 RX ORDER — OFLOXACIN 3 MG/ML
SOLUTION/ DROPS OPHTHALMIC
Qty: 5 ML | Refills: 0 | Status: SHIPPED | OUTPATIENT
Start: 2023-06-09 | End: 2023-09-07 | Stop reason: HOSPADM

## 2023-08-22 ENCOUNTER — E-ADVICE (OUTPATIENT)
Dept: INTERNAL MEDICINE | Age: 38
End: 2023-08-22

## 2023-09-07 ENCOUNTER — V-VISIT (OUTPATIENT)
Dept: INTERNAL MEDICINE | Age: 38
End: 2023-09-07

## 2023-09-07 DIAGNOSIS — Z30.09 GENERAL COUNSELING AND ADVICE ON FEMALE CONTRACEPTION: Primary | ICD-10-CM

## 2023-09-07 PROCEDURE — 99213 OFFICE O/P EST LOW 20 MIN: CPT

## 2023-09-13 RX ORDER — NORETHINDRONE ACETATE AND ETHINYL ESTRADIOL 1; .02 MG/1; MG/1
TABLET ORAL
Qty: 84 TABLET | Refills: 3 | Status: SHIPPED | OUTPATIENT
Start: 2023-09-13

## 2023-09-16 ENCOUNTER — E-ADVICE (OUTPATIENT)
Dept: INTERNAL MEDICINE | Age: 38
End: 2023-09-16

## 2023-09-16 DIAGNOSIS — Z30.09 GENERAL COUNSELING AND ADVICE ON FEMALE CONTRACEPTION: ICD-10-CM

## 2023-10-06 DIAGNOSIS — Z30.09 GENERAL COUNSELING AND ADVICE ON FEMALE CONTRACEPTION: ICD-10-CM

## 2023-10-18 ENCOUNTER — TELEPHONE (OUTPATIENT)
Dept: INTERNAL MEDICINE | Age: 38
End: 2023-10-18

## 2023-10-19 ENCOUNTER — E-ADVICE (OUTPATIENT)
Dept: INTERNAL MEDICINE | Age: 38
End: 2023-10-19

## 2023-11-13 ENCOUNTER — E-ADVICE (OUTPATIENT)
Dept: INTERNAL MEDICINE | Age: 38
End: 2023-11-13

## 2023-11-13 DIAGNOSIS — Z30.9 ENCOUNTER FOR CONTRACEPTIVE MANAGEMENT, UNSPECIFIED TYPE: Primary | ICD-10-CM

## 2024-01-22 ENCOUNTER — E-ADVICE (OUTPATIENT)
Dept: INTERNAL MEDICINE | Age: 39
End: 2024-01-22

## 2024-01-23 ENCOUNTER — E-ADVICE (OUTPATIENT)
Dept: OTHER | Age: 39
End: 2024-01-23

## 2024-02-05 ENCOUNTER — LAB SERVICES (OUTPATIENT)
Dept: LAB | Age: 39
End: 2024-02-05

## 2024-02-05 ENCOUNTER — APPOINTMENT (OUTPATIENT)
Dept: INTERNAL MEDICINE | Age: 39
End: 2024-02-05

## 2024-02-05 VITALS
BODY MASS INDEX: 23.69 KG/M2 | OXYGEN SATURATION: 97 % | WEIGHT: 133.71 LBS | HEART RATE: 78 BPM | TEMPERATURE: 97.9 F | HEIGHT: 63 IN | SYSTOLIC BLOOD PRESSURE: 109 MMHG | DIASTOLIC BLOOD PRESSURE: 68 MMHG

## 2024-02-05 DIAGNOSIS — R61 NIGHT SWEATS: ICD-10-CM

## 2024-02-05 DIAGNOSIS — Z00.00 ANNUAL PHYSICAL EXAM: Primary | ICD-10-CM

## 2024-02-05 LAB
ALBUMIN SERPL-MCNC: 3.3 G/DL (ref 3.6–5.1)
ALBUMIN/GLOB SERPL: 1 {RATIO} (ref 1–2.4)
ALP SERPL-CCNC: 72 UNITS/L (ref 45–117)
ALT SERPL-CCNC: 39 UNITS/L
ANION GAP SERPL CALC-SCNC: 6 MMOL/L (ref 7–19)
AST SERPL-CCNC: 34 UNITS/L
BASOPHILS # BLD: 0.1 K/MCL (ref 0–0.3)
BASOPHILS NFR BLD: 1 %
BILIRUB SERPL-MCNC: 0.5 MG/DL (ref 0.2–1)
BUN SERPL-MCNC: 9 MG/DL (ref 6–20)
BUN/CREAT SERPL: 25 (ref 7–25)
CALCIUM SERPL-MCNC: 8.2 MG/DL (ref 8.4–10.2)
CHLORIDE SERPL-SCNC: 108 MMOL/L (ref 97–110)
CO2 SERPL-SCNC: 29 MMOL/L (ref 21–32)
CREAT SERPL-MCNC: 0.36 MG/DL (ref 0.51–0.95)
CRP SERPL-MCNC: 0.4 MG/DL
DEPRECATED RDW RBC: 42.4 FL (ref 39–50)
EGFRCR SERPLBLD CKD-EPI 2021: >90 ML/MIN/{1.73_M2}
EOSINOPHIL # BLD: 0.1 K/MCL (ref 0–0.5)
EOSINOPHIL NFR BLD: 1 %
ERYTHROCYTE [DISTWIDTH] IN BLOOD: 12.4 % (ref 11–15)
FASTING DURATION TIME PATIENT: 2 HOURS (ref 0–999)
GLOBULIN SER-MCNC: 3.4 G/DL (ref 2–4)
GLUCOSE SERPL-MCNC: 94 MG/DL (ref 70–99)
HCT VFR BLD CALC: 37.9 % (ref 36–46.5)
HGB BLD-MCNC: 12.4 G/DL (ref 12–15.5)
IMM GRANULOCYTES # BLD AUTO: 0 K/MCL (ref 0–0.2)
IMM GRANULOCYTES # BLD: 0 %
LYMPHOCYTES # BLD: 3.1 K/MCL (ref 1–4.8)
LYMPHOCYTES NFR BLD: 55 %
MCH RBC QN AUTO: 30.4 PG (ref 26–34)
MCHC RBC AUTO-ENTMCNC: 32.7 G/DL (ref 32–36.5)
MCV RBC AUTO: 92.9 FL (ref 78–100)
MONOCYTES # BLD: 0.5 K/MCL (ref 0.3–0.9)
MONOCYTES NFR BLD: 9 %
NEUTROPHILS # BLD: 2 K/MCL (ref 1.8–7.7)
NEUTROPHILS NFR BLD: 34 %
NRBC BLD MANUAL-RTO: 0 /100 WBC
PLATELET # BLD AUTO: 260 K/MCL (ref 140–450)
POTASSIUM SERPL-SCNC: 4.1 MMOL/L (ref 3.4–5.1)
PROT SERPL-MCNC: 6.7 G/DL (ref 6.4–8.2)
RBC # BLD: 4.08 MIL/MCL (ref 4–5.2)
SODIUM SERPL-SCNC: 139 MMOL/L (ref 135–145)
TSH SERPL-ACNC: 1.19 MCUNITS/ML (ref 0.35–5)
WBC # BLD: 5.8 K/MCL (ref 4.2–11)

## 2024-02-05 PROCEDURE — 99395 PREV VISIT EST AGE 18-39: CPT | Performed by: INTERNAL MEDICINE

## 2024-02-05 PROCEDURE — 80050 GENERAL HEALTH PANEL: CPT | Performed by: CLINICAL MEDICAL LABORATORY

## 2024-02-05 PROCEDURE — 36415 COLL VENOUS BLD VENIPUNCTURE: CPT | Performed by: INTERNAL MEDICINE

## 2024-02-05 PROCEDURE — 86140 C-REACTIVE PROTEIN: CPT | Performed by: CLINICAL MEDICAL LABORATORY

## 2024-02-05 ASSESSMENT — PATIENT HEALTH QUESTIONNAIRE - PHQ9
1. LITTLE INTEREST OR PLEASURE IN DOING THINGS: NOT AT ALL
SUM OF ALL RESPONSES TO PHQ9 QUESTIONS 1 AND 2: 0
CLINICAL INTERPRETATION OF PHQ2 SCORE: NO FURTHER SCREENING NEEDED
2. FEELING DOWN, DEPRESSED OR HOPELESS: NOT AT ALL
SUM OF ALL RESPONSES TO PHQ9 QUESTIONS 1 AND 2: 0

## 2024-03-03 DIAGNOSIS — Z30.9 ENCOUNTER FOR CONTRACEPTIVE MANAGEMENT, UNSPECIFIED TYPE: ICD-10-CM

## 2024-03-05 RX ORDER — NORELGESTROMIN AND ETHINYL ESTRADIOL 35; 150 UG/MG; UG/MG
1 PATCH TRANSDERMAL
Qty: 12 PATCH | Refills: 3 | Status: SHIPPED | OUTPATIENT
Start: 2024-03-11

## 2024-05-07 ENCOUNTER — APPOINTMENT (OUTPATIENT)
Dept: INTERNAL MEDICINE | Age: 39
End: 2024-05-07

## 2024-05-08 ENCOUNTER — TELEPHONE (OUTPATIENT)
Dept: INTERNAL MEDICINE | Age: 39
End: 2024-05-08

## 2024-08-14 ENCOUNTER — TELEPHONE (OUTPATIENT)
Dept: INTERNAL MEDICINE | Age: 39
End: 2024-08-14

## 2024-08-14 DIAGNOSIS — Z30.9 ENCOUNTER FOR CONTRACEPTIVE MANAGEMENT, UNSPECIFIED TYPE: ICD-10-CM

## 2024-08-15 RX ORDER — NORELGESTROMIN AND ETHINYL ESTRADIOL 35; 150 UG/MG; UG/MG
1 PATCH TRANSDERMAL
Qty: 12 PATCH | Refills: 3 | OUTPATIENT
Start: 2024-08-15

## 2024-08-19 DIAGNOSIS — Z30.9 ENCOUNTER FOR CONTRACEPTIVE MANAGEMENT, UNSPECIFIED TYPE: ICD-10-CM

## 2024-08-20 RX ORDER — NORELGESTROMIN AND ETHINYL ESTRADIOL 35; 150 UG/MG; UG/MG
1 PATCH TRANSDERMAL
Qty: 9 PATCH | Refills: 3 | Status: SHIPPED | OUTPATIENT
Start: 2024-08-20

## 2024-08-30 ENCOUNTER — TELEPHONE (OUTPATIENT)
Dept: INTERNAL MEDICINE | Age: 39
End: 2024-08-30

## 2024-08-30 DIAGNOSIS — Z30.9 ENCOUNTER FOR CONTRACEPTIVE MANAGEMENT, UNSPECIFIED TYPE: ICD-10-CM

## 2024-09-04 RX ORDER — NORELGESTROMIN AND ETHINYL ESTRADIOL 35; 150 UG/MG; UG/MG
1 PATCH TRANSDERMAL
Qty: 9 PATCH | Refills: 3 | Status: SHIPPED | OUTPATIENT
Start: 2024-09-04

## 2025-02-10 ENCOUNTER — APPOINTMENT (OUTPATIENT)
Dept: INTERNAL MEDICINE | Age: 40
End: 2025-02-10

## (undated) NOTE — LETTER
2017              [facility] [id number]         To Whom It May Concern,    Lindsey Alarcon is pregnant and under my care. She may participate in the prenatal water exercise class.     Sincerely,    Severiano Natal